# Patient Record
Sex: FEMALE | Race: WHITE | NOT HISPANIC OR LATINO | URBAN - METROPOLITAN AREA
[De-identification: names, ages, dates, MRNs, and addresses within clinical notes are randomized per-mention and may not be internally consistent; named-entity substitution may affect disease eponyms.]

---

## 2023-01-01 ENCOUNTER — INPATIENT (INPATIENT)
Facility: HOSPITAL | Age: 68
LOS: 5 days | End: 2023-01-26
Attending: INTERNAL MEDICINE | Admitting: INTERNAL MEDICINE
Payer: SELF-PAY

## 2023-01-01 VITALS
HEART RATE: 114 BPM | SYSTOLIC BLOOD PRESSURE: 174 MMHG | WEIGHT: 175.05 LBS | OXYGEN SATURATION: 99 % | DIASTOLIC BLOOD PRESSURE: 103 MMHG | RESPIRATION RATE: 12 BRPM

## 2023-01-01 LAB
A1C WITH ESTIMATED AVERAGE GLUCOSE RESULT: 5.2 % — SIGNIFICANT CHANGE UP (ref 4–5.6)
ALBUMIN SERPL ELPH-MCNC: 2.5 G/DL — LOW (ref 3.5–5.2)
ALBUMIN SERPL ELPH-MCNC: 2.6 G/DL — LOW (ref 3.5–5.2)
ALBUMIN SERPL ELPH-MCNC: 2.8 G/DL — LOW (ref 3.5–5.2)
ALBUMIN SERPL ELPH-MCNC: 3.1 G/DL — LOW (ref 3.5–5.2)
ALBUMIN SERPL ELPH-MCNC: 3.7 G/DL — SIGNIFICANT CHANGE UP (ref 3.5–5.2)
ALP SERPL-CCNC: 62 U/L — SIGNIFICANT CHANGE UP (ref 30–115)
ALP SERPL-CCNC: 64 U/L — SIGNIFICANT CHANGE UP (ref 30–115)
ALP SERPL-CCNC: 69 U/L — SIGNIFICANT CHANGE UP (ref 30–115)
ALP SERPL-CCNC: 74 U/L — SIGNIFICANT CHANGE UP (ref 30–115)
ALP SERPL-CCNC: 81 U/L — SIGNIFICANT CHANGE UP (ref 30–115)
ALP SERPL-CCNC: 85 U/L — SIGNIFICANT CHANGE UP (ref 30–115)
ALP SERPL-CCNC: 88 U/L — SIGNIFICANT CHANGE UP (ref 30–115)
ALT FLD-CCNC: 20 U/L — SIGNIFICANT CHANGE UP (ref 0–41)
ALT FLD-CCNC: 23 U/L — SIGNIFICANT CHANGE UP (ref 0–41)
ALT FLD-CCNC: 29 U/L — SIGNIFICANT CHANGE UP (ref 0–41)
ALT FLD-CCNC: 40 U/L — SIGNIFICANT CHANGE UP (ref 0–41)
ALT FLD-CCNC: 58 U/L — HIGH (ref 0–41)
ALT FLD-CCNC: 90 U/L — HIGH (ref 0–41)
ALT FLD-CCNC: 93 U/L — HIGH (ref 0–41)
ANION GAP SERPL CALC-SCNC: 10 MMOL/L — SIGNIFICANT CHANGE UP (ref 7–14)
ANION GAP SERPL CALC-SCNC: 11 MMOL/L — SIGNIFICANT CHANGE UP (ref 7–14)
ANION GAP SERPL CALC-SCNC: 11 MMOL/L — SIGNIFICANT CHANGE UP (ref 7–14)
ANION GAP SERPL CALC-SCNC: 12 MMOL/L — SIGNIFICANT CHANGE UP (ref 7–14)
ANION GAP SERPL CALC-SCNC: 12 MMOL/L — SIGNIFICANT CHANGE UP (ref 7–14)
ANION GAP SERPL CALC-SCNC: 13 MMOL/L — SIGNIFICANT CHANGE UP (ref 7–14)
ANION GAP SERPL CALC-SCNC: 16 MMOL/L — HIGH (ref 7–14)
ANION GAP SERPL CALC-SCNC: 20 MMOL/L — HIGH (ref 7–14)
ANISOCYTOSIS BLD QL: SLIGHT — SIGNIFICANT CHANGE UP
APPEARANCE UR: ABNORMAL
APTT BLD: 30.8 SEC — SIGNIFICANT CHANGE UP (ref 27–39.2)
APTT BLD: 38.7 SEC — SIGNIFICANT CHANGE UP (ref 27–39.2)
APTT BLD: 40.7 SEC — HIGH (ref 27–39.2)
APTT BLD: 41.9 SEC — HIGH (ref 27–39.2)
APTT BLD: 43 SEC — HIGH (ref 27–39.2)
APTT BLD: 48.6 SEC — HIGH (ref 27–39.2)
APTT BLD: 51 SEC — HIGH (ref 27–39.2)
APTT BLD: 55 SEC — HIGH (ref 27–39.2)
APTT BLD: 57.7 SEC — HIGH (ref 27–39.2)
AST SERPL-CCNC: 104 U/L — HIGH (ref 0–41)
AST SERPL-CCNC: 127 U/L — HIGH (ref 0–41)
AST SERPL-CCNC: 163 U/L — HIGH (ref 0–41)
AST SERPL-CCNC: 318 U/L — HIGH (ref 0–41)
AST SERPL-CCNC: 63 U/L — HIGH (ref 0–41)
AST SERPL-CCNC: 73 U/L — HIGH (ref 0–41)
AST SERPL-CCNC: 75 U/L — HIGH (ref 0–41)
BACTERIA # UR AUTO: ABNORMAL
BASE EXCESS BLDA CALC-SCNC: -7.5 MMOL/L — LOW (ref -2–3)
BASOPHILS # BLD AUTO: 0.04 K/UL — SIGNIFICANT CHANGE UP (ref 0–0.2)
BASOPHILS # BLD AUTO: 0.06 K/UL — SIGNIFICANT CHANGE UP (ref 0–0.2)
BASOPHILS # BLD AUTO: 0.07 K/UL — SIGNIFICANT CHANGE UP (ref 0–0.2)
BASOPHILS # BLD AUTO: 0.09 K/UL — SIGNIFICANT CHANGE UP (ref 0–0.2)
BASOPHILS # BLD AUTO: 0.1 K/UL — SIGNIFICANT CHANGE UP (ref 0–0.2)
BASOPHILS # BLD AUTO: 0.1 K/UL — SIGNIFICANT CHANGE UP (ref 0–0.2)
BASOPHILS # BLD AUTO: 0.22 K/UL — HIGH (ref 0–0.2)
BASOPHILS NFR BLD AUTO: 0.2 % — SIGNIFICANT CHANGE UP (ref 0–1)
BASOPHILS NFR BLD AUTO: 0.2 % — SIGNIFICANT CHANGE UP (ref 0–1)
BASOPHILS NFR BLD AUTO: 0.3 % — SIGNIFICANT CHANGE UP (ref 0–1)
BASOPHILS NFR BLD AUTO: 0.3 % — SIGNIFICANT CHANGE UP (ref 0–1)
BASOPHILS NFR BLD AUTO: 0.4 % — SIGNIFICANT CHANGE UP (ref 0–1)
BASOPHILS NFR BLD AUTO: 0.5 % — SIGNIFICANT CHANGE UP (ref 0–1)
BASOPHILS NFR BLD AUTO: 0.7 % — SIGNIFICANT CHANGE UP (ref 0–1)
BILIRUB SERPL-MCNC: 0.4 MG/DL — SIGNIFICANT CHANGE UP (ref 0.2–1.2)
BILIRUB SERPL-MCNC: 0.4 MG/DL — SIGNIFICANT CHANGE UP (ref 0.2–1.2)
BILIRUB SERPL-MCNC: 0.5 MG/DL — SIGNIFICANT CHANGE UP (ref 0.2–1.2)
BILIRUB SERPL-MCNC: 0.5 MG/DL — SIGNIFICANT CHANGE UP (ref 0.2–1.2)
BILIRUB SERPL-MCNC: 0.6 MG/DL — SIGNIFICANT CHANGE UP (ref 0.2–1.2)
BILIRUB UR-MCNC: NEGATIVE — SIGNIFICANT CHANGE UP
BLD GP AB SCN SERPL QL: SIGNIFICANT CHANGE UP
BLD GP AB SCN SERPL QL: SIGNIFICANT CHANGE UP
BUN SERPL-MCNC: 15 MG/DL — SIGNIFICANT CHANGE UP (ref 10–20)
BUN SERPL-MCNC: 25 MG/DL — HIGH (ref 10–20)
BUN SERPL-MCNC: 29 MG/DL — HIGH (ref 10–20)
BUN SERPL-MCNC: 35 MG/DL — HIGH (ref 10–20)
BUN SERPL-MCNC: 36 MG/DL — HIGH (ref 10–20)
BUN SERPL-MCNC: 43 MG/DL — HIGH (ref 10–20)
BUN SERPL-MCNC: 46 MG/DL — HIGH (ref 10–20)
BUN SERPL-MCNC: 47 MG/DL — HIGH (ref 10–20)
CALCIUM SERPL-MCNC: 7.5 MG/DL — LOW (ref 8.4–10.5)
CALCIUM SERPL-MCNC: 7.6 MG/DL — LOW (ref 8.4–10.5)
CALCIUM SERPL-MCNC: 7.7 MG/DL — LOW (ref 8.4–10.5)
CALCIUM SERPL-MCNC: 7.9 MG/DL — LOW (ref 8.4–10.5)
CALCIUM SERPL-MCNC: 8.3 MG/DL — LOW (ref 8.4–10.5)
CALCIUM SERPL-MCNC: 8.5 MG/DL — SIGNIFICANT CHANGE UP (ref 8.4–10.5)
CALCIUM SERPL-MCNC: 8.6 MG/DL — SIGNIFICANT CHANGE UP (ref 8.4–10.5)
CALCIUM SERPL-MCNC: 9 MG/DL — SIGNIFICANT CHANGE UP (ref 8.4–10.5)
CHLORIDE SERPL-SCNC: 103 MMOL/L — SIGNIFICANT CHANGE UP (ref 98–110)
CHLORIDE SERPL-SCNC: 108 MMOL/L — SIGNIFICANT CHANGE UP (ref 98–110)
CHLORIDE SERPL-SCNC: 109 MMOL/L — SIGNIFICANT CHANGE UP (ref 98–110)
CHLORIDE SERPL-SCNC: 109 MMOL/L — SIGNIFICANT CHANGE UP (ref 98–110)
CHLORIDE SERPL-SCNC: 111 MMOL/L — HIGH (ref 98–110)
CHLORIDE SERPL-SCNC: 113 MMOL/L — HIGH (ref 98–110)
CHOLEST SERPL-MCNC: 179 MG/DL — SIGNIFICANT CHANGE UP
CK MB CFR SERPL CALC: 224.5 NG/ML — HIGH (ref 0.6–6.3)
CK SERPL-CCNC: 2498 U/L — HIGH (ref 0–225)
CO2 SERPL-SCNC: 19 MMOL/L — SIGNIFICANT CHANGE UP (ref 17–32)
CO2 SERPL-SCNC: 20 MMOL/L — SIGNIFICANT CHANGE UP (ref 17–32)
CO2 SERPL-SCNC: 20 MMOL/L — SIGNIFICANT CHANGE UP (ref 17–32)
CO2 SERPL-SCNC: 22 MMOL/L — SIGNIFICANT CHANGE UP (ref 17–32)
CO2 SERPL-SCNC: 23 MMOL/L — SIGNIFICANT CHANGE UP (ref 17–32)
CO2 SERPL-SCNC: 24 MMOL/L — SIGNIFICANT CHANGE UP (ref 17–32)
COLOR SPEC: YELLOW — SIGNIFICANT CHANGE UP
CREAT SERPL-MCNC: 1.1 MG/DL — SIGNIFICANT CHANGE UP (ref 0.7–1.5)
CREAT SERPL-MCNC: 1.2 MG/DL — SIGNIFICANT CHANGE UP (ref 0.7–1.5)
CREAT SERPL-MCNC: 1.2 MG/DL — SIGNIFICANT CHANGE UP (ref 0.7–1.5)
CREAT SERPL-MCNC: 1.3 MG/DL — SIGNIFICANT CHANGE UP (ref 0.7–1.5)
CREAT SERPL-MCNC: 1.6 MG/DL — HIGH (ref 0.7–1.5)
CREAT SERPL-MCNC: 1.8 MG/DL — HIGH (ref 0.7–1.5)
CULTURE RESULTS: NO GROWTH — SIGNIFICANT CHANGE UP
CULTURE RESULTS: NO GROWTH — SIGNIFICANT CHANGE UP
CULTURE RESULTS: SIGNIFICANT CHANGE UP
D DIMER BLD IA.RAPID-MCNC: 622 NG/ML DDU — HIGH
DIFF PNL FLD: ABNORMAL
EGFR: 30 ML/MIN/1.73M2 — LOW
EGFR: 35 ML/MIN/1.73M2 — LOW
EGFR: 45 ML/MIN/1.73M2 — LOW
EGFR: 50 ML/MIN/1.73M2 — LOW
EGFR: 50 ML/MIN/1.73M2 — LOW
EGFR: 55 ML/MIN/1.73M2 — LOW
EOSINOPHIL # BLD AUTO: 0 K/UL — SIGNIFICANT CHANGE UP (ref 0–0.7)
EOSINOPHIL # BLD AUTO: 0 K/UL — SIGNIFICANT CHANGE UP (ref 0–0.7)
EOSINOPHIL # BLD AUTO: 0.01 K/UL — SIGNIFICANT CHANGE UP (ref 0–0.7)
EOSINOPHIL # BLD AUTO: 0.04 K/UL — SIGNIFICANT CHANGE UP (ref 0–0.7)
EOSINOPHIL # BLD AUTO: 0.05 K/UL — SIGNIFICANT CHANGE UP (ref 0–0.7)
EOSINOPHIL # BLD AUTO: 0.07 K/UL — SIGNIFICANT CHANGE UP (ref 0–0.7)
EOSINOPHIL # BLD AUTO: 0.12 K/UL — SIGNIFICANT CHANGE UP (ref 0–0.7)
EOSINOPHIL # BLD AUTO: 0.14 K/UL — SIGNIFICANT CHANGE UP (ref 0–0.7)
EOSINOPHIL # BLD AUTO: 0.31 K/UL — SIGNIFICANT CHANGE UP (ref 0–0.7)
EOSINOPHIL NFR BLD AUTO: 0 % — SIGNIFICANT CHANGE UP (ref 0–8)
EOSINOPHIL NFR BLD AUTO: 0.2 % — SIGNIFICANT CHANGE UP (ref 0–8)
EOSINOPHIL NFR BLD AUTO: 0.2 % — SIGNIFICANT CHANGE UP (ref 0–8)
EOSINOPHIL NFR BLD AUTO: 0.3 % — SIGNIFICANT CHANGE UP (ref 0–8)
EOSINOPHIL NFR BLD AUTO: 0.7 % — SIGNIFICANT CHANGE UP (ref 0–8)
EOSINOPHIL NFR BLD AUTO: 0.9 % — SIGNIFICANT CHANGE UP (ref 0–8)
EOSINOPHIL NFR BLD AUTO: 0.9 % — SIGNIFICANT CHANGE UP (ref 0–8)
EPI CELLS # UR: ABNORMAL /HPF
ESTIMATED AVERAGE GLUCOSE: 103 MG/DL — SIGNIFICANT CHANGE UP (ref 68–114)
FUNGITELL: SIGNIFICANT CHANGE UP PG/ML
GAS PNL BLDA: SIGNIFICANT CHANGE UP
GIANT PLATELETS BLD QL SMEAR: PRESENT — SIGNIFICANT CHANGE UP
GLUCOSE BLDC GLUCOMTR-MCNC: 239 MG/DL — HIGH (ref 70–99)
GLUCOSE SERPL-MCNC: 120 MG/DL — HIGH (ref 70–99)
GLUCOSE SERPL-MCNC: 121 MG/DL — HIGH (ref 70–99)
GLUCOSE SERPL-MCNC: 122 MG/DL — HIGH (ref 70–99)
GLUCOSE SERPL-MCNC: 128 MG/DL — HIGH (ref 70–99)
GLUCOSE SERPL-MCNC: 134 MG/DL — HIGH (ref 70–99)
GLUCOSE SERPL-MCNC: 137 MG/DL — HIGH (ref 70–99)
GLUCOSE SERPL-MCNC: 143 MG/DL — HIGH (ref 70–99)
GLUCOSE SERPL-MCNC: 343 MG/DL — HIGH (ref 70–99)
GLUCOSE UR QL: NEGATIVE MG/DL — SIGNIFICANT CHANGE UP
GRAM STN FLD: SIGNIFICANT CHANGE UP
GRAN CASTS # UR COMP ASSIST: ABNORMAL /LPF
HCO3 BLDA-SCNC: 19 MMOL/L — LOW (ref 21–28)
HCT VFR BLD CALC: 26 % — LOW (ref 37–47)
HCT VFR BLD CALC: 29 % — LOW (ref 37–47)
HCT VFR BLD CALC: 29.4 % — LOW (ref 37–47)
HCT VFR BLD CALC: 31.2 % — LOW (ref 37–47)
HCT VFR BLD CALC: 33.1 % — LOW (ref 37–47)
HCT VFR BLD CALC: 37 % — SIGNIFICANT CHANGE UP (ref 37–47)
HCT VFR BLD CALC: 42 % — SIGNIFICANT CHANGE UP (ref 37–47)
HCT VFR BLD CALC: 42.6 % — SIGNIFICANT CHANGE UP (ref 37–47)
HCT VFR BLD CALC: 49.8 % — HIGH (ref 37–47)
HCV AB S/CO SERPL IA: 0.04 COI — SIGNIFICANT CHANGE UP
HCV AB SERPL-IMP: SIGNIFICANT CHANGE UP
HDLC SERPL-MCNC: 43 MG/DL — LOW
HGB BLD-MCNC: 10.1 G/DL — LOW (ref 12–16)
HGB BLD-MCNC: 10.7 G/DL — LOW (ref 12–16)
HGB BLD-MCNC: 12.1 G/DL — SIGNIFICANT CHANGE UP (ref 12–16)
HGB BLD-MCNC: 13.9 G/DL — SIGNIFICANT CHANGE UP (ref 12–16)
HGB BLD-MCNC: 13.9 G/DL — SIGNIFICANT CHANGE UP (ref 12–16)
HGB BLD-MCNC: 16 G/DL — SIGNIFICANT CHANGE UP (ref 12–16)
HGB BLD-MCNC: 8.1 G/DL — LOW (ref 12–16)
HGB BLD-MCNC: 9.2 G/DL — LOW (ref 12–16)
HGB BLD-MCNC: 9.3 G/DL — LOW (ref 12–16)
HOROWITZ INDEX BLDA+IHG-RTO: 70 — SIGNIFICANT CHANGE UP
IMM GRANULOCYTES NFR BLD AUTO: 0.5 % — HIGH (ref 0.1–0.3)
IMM GRANULOCYTES NFR BLD AUTO: 0.6 % — HIGH (ref 0.1–0.3)
IMM GRANULOCYTES NFR BLD AUTO: 0.7 % — HIGH (ref 0.1–0.3)
IMM GRANULOCYTES NFR BLD AUTO: 1 % — HIGH (ref 0.1–0.3)
IMM GRANULOCYTES NFR BLD AUTO: 1.5 % — HIGH (ref 0.1–0.3)
IMM GRANULOCYTES NFR BLD AUTO: 1.6 % — HIGH (ref 0.1–0.3)
IMM GRANULOCYTES NFR BLD AUTO: 2.6 % — HIGH (ref 0.1–0.3)
IMM GRANULOCYTES NFR BLD AUTO: 3.1 % — HIGH (ref 0.1–0.3)
IMM GRANULOCYTES NFR BLD AUTO: 3.2 % — HIGH (ref 0.1–0.3)
INR BLD: 1 RATIO — SIGNIFICANT CHANGE UP (ref 0.65–1.3)
KETONES UR-MCNC: NEGATIVE — SIGNIFICANT CHANGE UP
LACTATE SERPL-SCNC: 2.7 MMOL/L — HIGH (ref 0.7–2)
LEGIONELLA AG UR QL: NEGATIVE — SIGNIFICANT CHANGE UP
LEUKOCYTE ESTERASE UR-ACNC: NEGATIVE — SIGNIFICANT CHANGE UP
LIPID PNL WITH DIRECT LDL SERPL: 104 MG/DL — HIGH
LYMPHOCYTES # BLD AUTO: 1.52 K/UL — SIGNIFICANT CHANGE UP (ref 1.2–3.4)
LYMPHOCYTES # BLD AUTO: 1.9 K/UL — SIGNIFICANT CHANGE UP (ref 1.2–3.4)
LYMPHOCYTES # BLD AUTO: 10.3 % — LOW (ref 20.5–51.1)
LYMPHOCYTES # BLD AUTO: 10.6 % — LOW (ref 20.5–51.1)
LYMPHOCYTES # BLD AUTO: 12.4 % — LOW (ref 20.5–51.1)
LYMPHOCYTES # BLD AUTO: 13.3 % — LOW (ref 20.5–51.1)
LYMPHOCYTES # BLD AUTO: 15 % — LOW (ref 20.5–51.1)
LYMPHOCYTES # BLD AUTO: 15.3 % — LOW (ref 20.5–51.1)
LYMPHOCYTES # BLD AUTO: 2.07 K/UL — SIGNIFICANT CHANGE UP (ref 1.2–3.4)
LYMPHOCYTES # BLD AUTO: 2.38 K/UL — SIGNIFICANT CHANGE UP (ref 1.2–3.4)
LYMPHOCYTES # BLD AUTO: 2.72 K/UL — SIGNIFICANT CHANGE UP (ref 1.2–3.4)
LYMPHOCYTES # BLD AUTO: 2.87 K/UL — SIGNIFICANT CHANGE UP (ref 1.2–3.4)
LYMPHOCYTES # BLD AUTO: 24.06 K/UL — HIGH (ref 1.2–3.4)
LYMPHOCYTES # BLD AUTO: 3.15 K/UL — SIGNIFICANT CHANGE UP (ref 1.2–3.4)
LYMPHOCYTES # BLD AUTO: 3.29 K/UL — SIGNIFICANT CHANGE UP (ref 1.2–3.4)
LYMPHOCYTES # BLD AUTO: 6.7 % — LOW (ref 20.5–51.1)
LYMPHOCYTES # BLD AUTO: 71.5 % — HIGH (ref 20.5–51.1)
LYMPHOCYTES # BLD AUTO: 9.5 % — LOW (ref 20.5–51.1)
MAGNESIUM SERPL-MCNC: 2 MG/DL — SIGNIFICANT CHANGE UP (ref 1.8–2.4)
MAGNESIUM SERPL-MCNC: 2.4 MG/DL — SIGNIFICANT CHANGE UP (ref 1.8–2.4)
MAGNESIUM SERPL-MCNC: 2.5 MG/DL — HIGH (ref 1.8–2.4)
MAGNESIUM SERPL-MCNC: 2.6 MG/DL — HIGH (ref 1.8–2.4)
MAGNESIUM SERPL-MCNC: 2.6 MG/DL — HIGH (ref 1.8–2.4)
MANUAL DIF COMMENT BLD-IMP: SIGNIFICANT CHANGE UP
MANUAL SMEAR VERIFICATION: SIGNIFICANT CHANGE UP
MCHC RBC-ENTMCNC: 27.6 PG — SIGNIFICANT CHANGE UP (ref 27–31)
MCHC RBC-ENTMCNC: 27.9 PG — SIGNIFICANT CHANGE UP (ref 27–31)
MCHC RBC-ENTMCNC: 27.9 PG — SIGNIFICANT CHANGE UP (ref 27–31)
MCHC RBC-ENTMCNC: 28 PG — SIGNIFICANT CHANGE UP (ref 27–31)
MCHC RBC-ENTMCNC: 28 PG — SIGNIFICANT CHANGE UP (ref 27–31)
MCHC RBC-ENTMCNC: 28.2 PG — SIGNIFICANT CHANGE UP (ref 27–31)
MCHC RBC-ENTMCNC: 28.2 PG — SIGNIFICANT CHANGE UP (ref 27–31)
MCHC RBC-ENTMCNC: 28.3 PG — SIGNIFICANT CHANGE UP (ref 27–31)
MCHC RBC-ENTMCNC: 28.3 PG — SIGNIFICANT CHANGE UP (ref 27–31)
MCHC RBC-ENTMCNC: 31.2 G/DL — LOW (ref 32–37)
MCHC RBC-ENTMCNC: 31.3 G/DL — LOW (ref 32–37)
MCHC RBC-ENTMCNC: 32.1 G/DL — SIGNIFICANT CHANGE UP (ref 32–37)
MCHC RBC-ENTMCNC: 32.1 G/DL — SIGNIFICANT CHANGE UP (ref 32–37)
MCHC RBC-ENTMCNC: 32.3 G/DL — SIGNIFICANT CHANGE UP (ref 32–37)
MCHC RBC-ENTMCNC: 32.4 G/DL — SIGNIFICANT CHANGE UP (ref 32–37)
MCHC RBC-ENTMCNC: 32.6 G/DL — SIGNIFICANT CHANGE UP (ref 32–37)
MCHC RBC-ENTMCNC: 32.7 G/DL — SIGNIFICANT CHANGE UP (ref 32–37)
MCHC RBC-ENTMCNC: 33.1 G/DL — SIGNIFICANT CHANGE UP (ref 32–37)
MCV RBC AUTO: 84.2 FL — SIGNIFICANT CHANGE UP (ref 81–99)
MCV RBC AUTO: 84.7 FL — SIGNIFICANT CHANGE UP (ref 81–99)
MCV RBC AUTO: 85.6 FL — SIGNIFICANT CHANGE UP (ref 81–99)
MCV RBC AUTO: 87.1 FL — SIGNIFICANT CHANGE UP (ref 81–99)
MCV RBC AUTO: 87.4 FL — SIGNIFICANT CHANGE UP (ref 81–99)
MCV RBC AUTO: 87.9 FL — SIGNIFICANT CHANGE UP (ref 81–99)
MCV RBC AUTO: 88 FL — SIGNIFICANT CHANGE UP (ref 81–99)
MCV RBC AUTO: 89.1 FL — SIGNIFICANT CHANGE UP (ref 81–99)
MCV RBC AUTO: 90 FL — SIGNIFICANT CHANGE UP (ref 81–99)
MONOCYTES # BLD AUTO: 0.88 K/UL — HIGH (ref 0.1–0.6)
MONOCYTES # BLD AUTO: 1.29 K/UL — HIGH (ref 0.1–0.6)
MONOCYTES # BLD AUTO: 1.67 K/UL — HIGH (ref 0.1–0.6)
MONOCYTES # BLD AUTO: 1.7 K/UL — HIGH (ref 0.1–0.6)
MONOCYTES # BLD AUTO: 1.71 K/UL — HIGH (ref 0.1–0.6)
MONOCYTES # BLD AUTO: 1.82 K/UL — HIGH (ref 0.1–0.6)
MONOCYTES # BLD AUTO: 2.03 K/UL — HIGH (ref 0.1–0.6)
MONOCYTES # BLD AUTO: 2.63 K/UL — HIGH (ref 0.1–0.6)
MONOCYTES # BLD AUTO: 3.04 K/UL — HIGH (ref 0.1–0.6)
MONOCYTES NFR BLD AUTO: 12.3 % — HIGH (ref 1.7–9.3)
MONOCYTES NFR BLD AUTO: 2.6 % — SIGNIFICANT CHANGE UP (ref 1.7–9.3)
MONOCYTES NFR BLD AUTO: 7.5 % — SIGNIFICANT CHANGE UP (ref 1.7–9.3)
MONOCYTES NFR BLD AUTO: 7.5 % — SIGNIFICANT CHANGE UP (ref 1.7–9.3)
MONOCYTES NFR BLD AUTO: 8.3 % — SIGNIFICANT CHANGE UP (ref 1.7–9.3)
MONOCYTES NFR BLD AUTO: 8.4 % — SIGNIFICANT CHANGE UP (ref 1.7–9.3)
MONOCYTES NFR BLD AUTO: 9.4 % — HIGH (ref 1.7–9.3)
MONOCYTES NFR BLD AUTO: 9.5 % — HIGH (ref 1.7–9.3)
MONOCYTES NFR BLD AUTO: 9.7 % — HIGH (ref 1.7–9.3)
MRSA PCR RESULT.: NEGATIVE — SIGNIFICANT CHANGE UP
NEUTROPHILS # BLD AUTO: 15.14 K/UL — HIGH (ref 1.4–6.5)
NEUTROPHILS # BLD AUTO: 15.93 K/UL — HIGH (ref 1.4–6.5)
NEUTROPHILS # BLD AUTO: 17.17 K/UL — HIGH (ref 1.4–6.5)
NEUTROPHILS # BLD AUTO: 18.11 K/UL — HIGH (ref 1.4–6.5)
NEUTROPHILS # BLD AUTO: 18.12 K/UL — HIGH (ref 1.4–6.5)
NEUTROPHILS # BLD AUTO: 19.02 K/UL — HIGH (ref 1.4–6.5)
NEUTROPHILS # BLD AUTO: 22.12 K/UL — HIGH (ref 1.4–6.5)
NEUTROPHILS # BLD AUTO: 7.16 K/UL — HIGH (ref 1.4–6.5)
NEUTROPHILS # BLD AUTO: 9.59 K/UL — HIGH (ref 1.4–6.5)
NEUTROPHILS NFR BLD AUTO: 21.2 % — LOW (ref 42.2–75.2)
NEUTROPHILS NFR BLD AUTO: 70.7 % — SIGNIFICANT CHANGE UP (ref 42.2–75.2)
NEUTROPHILS NFR BLD AUTO: 72 % — SIGNIFICANT CHANGE UP (ref 42.2–75.2)
NEUTROPHILS NFR BLD AUTO: 73.5 % — SIGNIFICANT CHANGE UP (ref 42.2–75.2)
NEUTROPHILS NFR BLD AUTO: 77.9 % — HIGH (ref 42.2–75.2)
NEUTROPHILS NFR BLD AUTO: 79.5 % — HIGH (ref 42.2–75.2)
NEUTROPHILS NFR BLD AUTO: 79.7 % — HIGH (ref 42.2–75.2)
NEUTROPHILS NFR BLD AUTO: 80.4 % — HIGH (ref 42.2–75.2)
NEUTROPHILS NFR BLD AUTO: 83.9 % — HIGH (ref 42.2–75.2)
NEUTS BAND # BLD: 17 % — HIGH (ref 0–6)
NEUTS BAND # BLD: 2 % — SIGNIFICANT CHANGE UP (ref 0–6)
NITRITE UR-MCNC: NEGATIVE — SIGNIFICANT CHANGE UP
NON HDL CHOLESTEROL: 136 MG/DL — HIGH
NRBC # BLD: 0 /100 WBCS — SIGNIFICANT CHANGE UP (ref 0–0)
NRBC # BLD: 0 /100 — SIGNIFICANT CHANGE UP (ref 0–0)
NRBC # BLD: 0 /100 — SIGNIFICANT CHANGE UP (ref 0–0)
NRBC # BLD: 1 /100 WBCS — HIGH (ref 0–0)
NRBC # BLD: 1 /100 WBCS — HIGH (ref 0–0)
NSE FLD-MCNC: 109 NG/ML — HIGH (ref 0–17.6)
NT-PROBNP SERPL-SCNC: HIGH PG/ML (ref 0–300)
PCO2 BLDA: 43 MMHG — SIGNIFICANT CHANGE UP (ref 35–48)
PH BLDA: 7.26 — LOW (ref 7.35–7.45)
PH UR: 5 — SIGNIFICANT CHANGE UP (ref 5–8)
PLAT MORPH BLD: NORMAL — SIGNIFICANT CHANGE UP
PLAT MORPH BLD: NORMAL — SIGNIFICANT CHANGE UP
PLATELET # BLD AUTO: 114 K/UL — LOW (ref 130–400)
PLATELET # BLD AUTO: 124 K/UL — LOW (ref 130–400)
PLATELET # BLD AUTO: 141 K/UL — SIGNIFICANT CHANGE UP (ref 130–400)
PLATELET # BLD AUTO: 162 K/UL — SIGNIFICANT CHANGE UP (ref 130–400)
PLATELET # BLD AUTO: 168 K/UL — SIGNIFICANT CHANGE UP (ref 130–400)
PLATELET # BLD AUTO: 174 K/UL — SIGNIFICANT CHANGE UP (ref 130–400)
PLATELET # BLD AUTO: 205 K/UL — SIGNIFICANT CHANGE UP (ref 130–400)
PLATELET # BLD AUTO: 263 K/UL — SIGNIFICANT CHANGE UP (ref 130–400)
PLATELET # BLD AUTO: 84 K/UL — LOW (ref 130–400)
PO2 BLDA: 80 MMHG — LOW (ref 83–108)
POLYCHROMASIA BLD QL SMEAR: SIGNIFICANT CHANGE UP
POTASSIUM SERPL-MCNC: 3.8 MMOL/L — SIGNIFICANT CHANGE UP (ref 3.5–5)
POTASSIUM SERPL-MCNC: 4 MMOL/L — SIGNIFICANT CHANGE UP (ref 3.5–5)
POTASSIUM SERPL-MCNC: 4 MMOL/L — SIGNIFICANT CHANGE UP (ref 3.5–5)
POTASSIUM SERPL-MCNC: 4.1 MMOL/L — SIGNIFICANT CHANGE UP (ref 3.5–5)
POTASSIUM SERPL-MCNC: 4.5 MMOL/L — SIGNIFICANT CHANGE UP (ref 3.5–5)
POTASSIUM SERPL-MCNC: 4.5 MMOL/L — SIGNIFICANT CHANGE UP (ref 3.5–5)
POTASSIUM SERPL-MCNC: 4.6 MMOL/L — SIGNIFICANT CHANGE UP (ref 3.5–5)
POTASSIUM SERPL-MCNC: 4.9 MMOL/L — SIGNIFICANT CHANGE UP (ref 3.5–5)
POTASSIUM SERPL-SCNC: 3.8 MMOL/L — SIGNIFICANT CHANGE UP (ref 3.5–5)
POTASSIUM SERPL-SCNC: 4 MMOL/L — SIGNIFICANT CHANGE UP (ref 3.5–5)
POTASSIUM SERPL-SCNC: 4 MMOL/L — SIGNIFICANT CHANGE UP (ref 3.5–5)
POTASSIUM SERPL-SCNC: 4.1 MMOL/L — SIGNIFICANT CHANGE UP (ref 3.5–5)
POTASSIUM SERPL-SCNC: 4.5 MMOL/L — SIGNIFICANT CHANGE UP (ref 3.5–5)
POTASSIUM SERPL-SCNC: 4.5 MMOL/L — SIGNIFICANT CHANGE UP (ref 3.5–5)
POTASSIUM SERPL-SCNC: 4.6 MMOL/L — SIGNIFICANT CHANGE UP (ref 3.5–5)
POTASSIUM SERPL-SCNC: 4.9 MMOL/L — SIGNIFICANT CHANGE UP (ref 3.5–5)
PROCALCITONIN SERPL-MCNC: 10.1 NG/ML — HIGH (ref 0.02–0.1)
PROCALCITONIN SERPL-MCNC: 2.93 NG/ML — HIGH (ref 0.02–0.1)
PROCALCITONIN SERPL-MCNC: 3.9 NG/ML — HIGH (ref 0.02–0.1)
PROMYELOCYTES # FLD: 1 % — HIGH (ref 0–0)
PROT SERPL-MCNC: 4.5 G/DL — LOW (ref 6–8)
PROT SERPL-MCNC: 4.7 G/DL — LOW (ref 6–8)
PROT SERPL-MCNC: 4.8 G/DL — LOW (ref 6–8)
PROT SERPL-MCNC: 5.1 G/DL — LOW (ref 6–8)
PROT SERPL-MCNC: 5.9 G/DL — LOW (ref 6–8)
PROT UR-MCNC: 100 MG/DL
PROTHROM AB SERPL-ACNC: 11.4 SEC — SIGNIFICANT CHANGE UP (ref 9.95–12.87)
RAPID RVP RESULT: DETECTED
RBC # BLD: 2.89 M/UL — LOW (ref 4.2–5.4)
RBC # BLD: 3.3 M/UL — LOW (ref 4.2–5.4)
RBC # BLD: 3.3 M/UL — LOW (ref 4.2–5.4)
RBC # BLD: 3.57 M/UL — LOW (ref 4.2–5.4)
RBC # BLD: 3.8 M/UL — LOW (ref 4.2–5.4)
RBC # BLD: 4.32 M/UL — SIGNIFICANT CHANGE UP (ref 4.2–5.4)
RBC # BLD: 4.99 M/UL — SIGNIFICANT CHANGE UP (ref 4.2–5.4)
RBC # BLD: 5.03 M/UL — SIGNIFICANT CHANGE UP (ref 4.2–5.4)
RBC # BLD: 5.66 M/UL — HIGH (ref 4.2–5.4)
RBC # FLD: 13.7 % — SIGNIFICANT CHANGE UP (ref 11.5–14.5)
RBC # FLD: 13.9 % — SIGNIFICANT CHANGE UP (ref 11.5–14.5)
RBC # FLD: 14.3 % — SIGNIFICANT CHANGE UP (ref 11.5–14.5)
RBC # FLD: 14.4 % — SIGNIFICANT CHANGE UP (ref 11.5–14.5)
RBC # FLD: 14.6 % — HIGH (ref 11.5–14.5)
RBC # FLD: 14.7 % — HIGH (ref 11.5–14.5)
RBC # FLD: 15 % — HIGH (ref 11.5–14.5)
RBC BLD AUTO: ABNORMAL
RBC BLD AUTO: NORMAL — SIGNIFICANT CHANGE UP
RBC CASTS # UR COMP ASSIST: ABNORMAL /HPF
RV+EV RNA SPEC QL NAA+PROBE: DETECTED
S PNEUM AG UR QL: NEGATIVE — SIGNIFICANT CHANGE UP
SAO2 % BLDA: 95.7 % — SIGNIFICANT CHANGE UP (ref 94–98)
SARS-COV-2 RNA SPEC QL NAA+PROBE: SIGNIFICANT CHANGE UP
SARS-COV-2 RNA SPEC QL NAA+PROBE: SIGNIFICANT CHANGE UP
SODIUM SERPL-SCNC: 143 MMOL/L — SIGNIFICANT CHANGE UP (ref 135–146)
SODIUM SERPL-SCNC: 144 MMOL/L — SIGNIFICANT CHANGE UP (ref 135–146)
SODIUM SERPL-SCNC: 144 MMOL/L — SIGNIFICANT CHANGE UP (ref 135–146)
SODIUM SERPL-SCNC: 146 MMOL/L — SIGNIFICANT CHANGE UP (ref 135–146)
SODIUM SERPL-SCNC: 146 MMOL/L — SIGNIFICANT CHANGE UP (ref 135–146)
SP GR SPEC: 1.01 — SIGNIFICANT CHANGE UP (ref 1.01–1.03)
SPECIMEN SOURCE: SIGNIFICANT CHANGE UP
TRIGL SERPL-MCNC: 161 MG/DL — HIGH
TROPONIN T SERPL-MCNC: 0.61 NG/ML — CRITICAL HIGH
TROPONIN T SERPL-MCNC: 10.53 NG/ML — CRITICAL HIGH
TROPONIN T SERPL-MCNC: 10.89 NG/ML — CRITICAL HIGH
TROPONIN T SERPL-MCNC: 2.76 NG/ML — CRITICAL HIGH
TROPONIN T SERPL-MCNC: 4.32 NG/ML — CRITICAL HIGH
TROPONIN T SERPL-MCNC: 6.53 NG/ML — CRITICAL HIGH
TROPONIN T SERPL-MCNC: 9.45 NG/ML — CRITICAL HIGH
UROBILINOGEN FLD QL: 0.2 MG/DL — SIGNIFICANT CHANGE UP
VARIANT LYMPHS # BLD: 10 % — HIGH (ref 0–5)
WBC # BLD: 13.56 K/UL — HIGH (ref 4.8–10.8)
WBC # BLD: 19.98 K/UL — HIGH (ref 4.8–10.8)
WBC # BLD: 21.02 K/UL — HIGH (ref 4.8–10.8)
WBC # BLD: 22.01 K/UL — HIGH (ref 4.8–10.8)
WBC # BLD: 22.54 K/UL — HIGH (ref 4.8–10.8)
WBC # BLD: 22.68 K/UL — HIGH (ref 4.8–10.8)
WBC # BLD: 24.68 K/UL — HIGH (ref 4.8–10.8)
WBC # BLD: 27.86 K/UL — HIGH (ref 4.8–10.8)
WBC # BLD: 33.66 K/UL — HIGH (ref 4.8–10.8)
WBC # FLD AUTO: 13.56 K/UL — HIGH (ref 4.8–10.8)
WBC # FLD AUTO: 19.98 K/UL — HIGH (ref 4.8–10.8)
WBC # FLD AUTO: 21.02 K/UL — HIGH (ref 4.8–10.8)
WBC # FLD AUTO: 22.01 K/UL — HIGH (ref 4.8–10.8)
WBC # FLD AUTO: 22.54 K/UL — HIGH (ref 4.8–10.8)
WBC # FLD AUTO: 22.68 K/UL — HIGH (ref 4.8–10.8)
WBC # FLD AUTO: 24.68 K/UL — HIGH (ref 4.8–10.8)
WBC # FLD AUTO: 27.86 K/UL — HIGH (ref 4.8–10.8)
WBC # FLD AUTO: 33.66 K/UL — HIGH (ref 4.8–10.8)
WBC UR QL: SIGNIFICANT CHANGE UP /HPF

## 2023-01-01 PROCEDURE — 93306 TTE W/DOPPLER COMPLETE: CPT | Mod: 26

## 2023-01-01 PROCEDURE — 99291 CRITICAL CARE FIRST HOUR: CPT

## 2023-01-01 PROCEDURE — 99233 SBSQ HOSP IP/OBS HIGH 50: CPT | Mod: NC

## 2023-01-01 PROCEDURE — 93970 EXTREMITY STUDY: CPT | Mod: 26

## 2023-01-01 PROCEDURE — 70450 CT HEAD/BRAIN W/O DYE: CPT | Mod: 26

## 2023-01-01 PROCEDURE — 99233 SBSQ HOSP IP/OBS HIGH 50: CPT

## 2023-01-01 PROCEDURE — 99232 SBSQ HOSP IP/OBS MODERATE 35: CPT | Mod: NC

## 2023-01-01 PROCEDURE — 71045 X-RAY EXAM CHEST 1 VIEW: CPT | Mod: 26,77

## 2023-01-01 PROCEDURE — 99254 IP/OBS CNSLTJ NEW/EST MOD 60: CPT

## 2023-01-01 PROCEDURE — 99255 IP/OBS CONSLTJ NEW/EST HI 80: CPT

## 2023-01-01 PROCEDURE — 99223 1ST HOSP IP/OBS HIGH 75: CPT

## 2023-01-01 PROCEDURE — 95720 EEG PHY/QHP EA INCR W/VEEG: CPT

## 2023-01-01 PROCEDURE — 93010 ELECTROCARDIOGRAM REPORT: CPT | Mod: 77

## 2023-01-01 PROCEDURE — 71045 X-RAY EXAM CHEST 1 VIEW: CPT | Mod: 26

## 2023-01-01 PROCEDURE — 93010 ELECTROCARDIOGRAM REPORT: CPT

## 2023-01-01 PROCEDURE — 95819 EEG AWAKE AND ASLEEP: CPT | Mod: 26

## 2023-01-01 PROCEDURE — 33210 INSERT ELECTRD/PM CATH SNGL: CPT

## 2023-01-01 PROCEDURE — 74018 RADEX ABDOMEN 1 VIEW: CPT | Mod: 26

## 2023-01-01 PROCEDURE — 70450 CT HEAD/BRAIN W/O DYE: CPT | Mod: 26,MA

## 2023-01-01 PROCEDURE — 71045 X-RAY EXAM CHEST 1 VIEW: CPT | Mod: 26,76

## 2023-01-01 PROCEDURE — 99285 EMERGENCY DEPT VISIT HI MDM: CPT

## 2023-01-01 PROCEDURE — 71275 CT ANGIOGRAPHY CHEST: CPT | Mod: 26,MA

## 2023-01-01 PROCEDURE — 74177 CT ABD & PELVIS W/CONTRAST: CPT | Mod: 26,MA

## 2023-01-01 RX ORDER — ROBINUL 0.2 MG/ML
0.2 INJECTION INTRAMUSCULAR; INTRAVENOUS EVERY 6 HOURS
Refills: 0 | Status: DISCONTINUED | OUTPATIENT
Start: 2023-01-01 | End: 2023-01-01

## 2023-01-01 RX ORDER — LEVETIRACETAM 250 MG/1
500 TABLET, FILM COATED ORAL EVERY 12 HOURS
Refills: 0 | Status: DISCONTINUED | OUTPATIENT
Start: 2023-01-01 | End: 2023-01-01

## 2023-01-01 RX ORDER — MIDAZOLAM HYDROCHLORIDE 1 MG/ML
0.02 INJECTION, SOLUTION INTRAMUSCULAR; INTRAVENOUS
Qty: 100 | Refills: 0 | Status: DISCONTINUED | OUTPATIENT
Start: 2023-01-01 | End: 2023-01-01

## 2023-01-01 RX ORDER — INSULIN HUMAN 100 [IU]/ML
10 INJECTION, SOLUTION SUBCUTANEOUS ONCE
Refills: 0 | Status: COMPLETED | OUTPATIENT
Start: 2023-01-01 | End: 2023-01-01

## 2023-01-01 RX ORDER — VANCOMYCIN HCL 1 G
1250 VIAL (EA) INTRAVENOUS ONCE
Refills: 0 | Status: COMPLETED | OUTPATIENT
Start: 2023-01-01 | End: 2023-01-01

## 2023-01-01 RX ORDER — CEFTRIAXONE 500 MG/1
2000 INJECTION, POWDER, FOR SOLUTION INTRAMUSCULAR; INTRAVENOUS EVERY 24 HOURS
Refills: 0 | Status: DISCONTINUED | OUTPATIENT
Start: 2023-01-01 | End: 2023-01-01

## 2023-01-01 RX ORDER — SENNA PLUS 8.6 MG/1
2 TABLET ORAL AT BEDTIME
Refills: 0 | Status: DISCONTINUED | OUTPATIENT
Start: 2023-01-01 | End: 2023-01-01

## 2023-01-01 RX ORDER — CHLORHEXIDINE GLUCONATE 213 G/1000ML
1 SOLUTION TOPICAL
Refills: 0 | Status: DISCONTINUED | OUTPATIENT
Start: 2023-01-01 | End: 2023-01-01

## 2023-01-01 RX ORDER — FENTANYL CITRATE 50 UG/ML
25 INJECTION INTRAVENOUS ONCE
Refills: 0 | Status: DISCONTINUED | OUTPATIENT
Start: 2023-01-01 | End: 2023-01-01

## 2023-01-01 RX ORDER — ENOXAPARIN SODIUM 100 MG/ML
40 INJECTION SUBCUTANEOUS EVERY 24 HOURS
Refills: 0 | Status: DISCONTINUED | OUTPATIENT
Start: 2023-01-01 | End: 2023-01-01

## 2023-01-01 RX ORDER — CALCIUM CHLORIDE
1 POWDER (GRAM) MISCELLANEOUS ONCE
Refills: 0 | Status: COMPLETED | OUTPATIENT
Start: 2023-01-01 | End: 2023-01-01

## 2023-01-01 RX ORDER — PROPOFOL 10 MG/ML
100 INJECTION, EMULSION INTRAVENOUS ONCE
Refills: 0 | Status: COMPLETED | OUTPATIENT
Start: 2023-01-01 | End: 2023-01-01

## 2023-01-01 RX ORDER — SODIUM CHLORIDE 9 MG/ML
10 INJECTION INTRAMUSCULAR; INTRAVENOUS; SUBCUTANEOUS
Refills: 0 | Status: DISCONTINUED | OUTPATIENT
Start: 2023-01-01 | End: 2023-01-01

## 2023-01-01 RX ORDER — MORPHINE SULFATE 50 MG/1
1 CAPSULE, EXTENDED RELEASE ORAL
Qty: 100 | Refills: 0 | Status: DISCONTINUED | OUTPATIENT
Start: 2023-01-01 | End: 2023-01-01

## 2023-01-01 RX ORDER — NOREPINEPHRINE BITARTRATE/D5W 8 MG/250ML
0.05 PLASTIC BAG, INJECTION (ML) INTRAVENOUS
Qty: 16 | Refills: 0 | Status: DISCONTINUED | OUTPATIENT
Start: 2023-01-01 | End: 2023-01-01

## 2023-01-01 RX ORDER — PIPERACILLIN AND TAZOBACTAM 4; .5 G/20ML; G/20ML
3.38 INJECTION, POWDER, LYOPHILIZED, FOR SOLUTION INTRAVENOUS ONCE
Refills: 0 | Status: COMPLETED | OUTPATIENT
Start: 2023-01-01 | End: 2023-01-01

## 2023-01-01 RX ORDER — HEPARIN SODIUM 5000 [USP'U]/ML
5000 INJECTION INTRAVENOUS; SUBCUTANEOUS EVERY 12 HOURS
Refills: 0 | Status: DISCONTINUED | OUTPATIENT
Start: 2023-01-01 | End: 2023-01-01

## 2023-01-01 RX ORDER — HEPARIN SODIUM 5000 [USP'U]/ML
INJECTION INTRAVENOUS; SUBCUTANEOUS
Qty: 25000 | Refills: 0 | Status: DISCONTINUED | OUTPATIENT
Start: 2023-01-01 | End: 2023-01-01

## 2023-01-01 RX ORDER — CHLORHEXIDINE GLUCONATE 213 G/1000ML
15 SOLUTION TOPICAL EVERY 12 HOURS
Refills: 0 | Status: DISCONTINUED | OUTPATIENT
Start: 2023-01-01 | End: 2023-01-01

## 2023-01-01 RX ORDER — ACETAMINOPHEN 500 MG
650 TABLET ORAL EVERY 6 HOURS
Refills: 0 | Status: DISCONTINUED | OUTPATIENT
Start: 2023-01-01 | End: 2023-01-01

## 2023-01-01 RX ORDER — ACETAMINOPHEN 500 MG
1000 TABLET ORAL ONCE
Refills: 0 | Status: COMPLETED | OUTPATIENT
Start: 2023-01-01 | End: 2023-01-01

## 2023-01-01 RX ORDER — CEFTRIAXONE 500 MG/1
INJECTION, POWDER, FOR SOLUTION INTRAMUSCULAR; INTRAVENOUS
Refills: 0 | Status: DISCONTINUED | OUTPATIENT
Start: 2023-01-01 | End: 2023-01-01

## 2023-01-01 RX ORDER — DEXMEDETOMIDINE HYDROCHLORIDE IN 0.9% SODIUM CHLORIDE 4 UG/ML
0.2 INJECTION INTRAVENOUS
Qty: 400 | Refills: 0 | Status: DISCONTINUED | OUTPATIENT
Start: 2023-01-01 | End: 2023-01-01

## 2023-01-01 RX ORDER — ATORVASTATIN CALCIUM 80 MG/1
40 TABLET, FILM COATED ORAL AT BEDTIME
Refills: 0 | Status: DISCONTINUED | OUTPATIENT
Start: 2023-01-01 | End: 2023-01-01

## 2023-01-01 RX ORDER — BUMETANIDE 0.25 MG/ML
1 INJECTION INTRAMUSCULAR; INTRAVENOUS ONCE
Refills: 0 | Status: COMPLETED | OUTPATIENT
Start: 2023-01-01 | End: 2023-01-01

## 2023-01-01 RX ORDER — HEPARIN SODIUM 5000 [USP'U]/ML
5000 INJECTION INTRAVENOUS; SUBCUTANEOUS ONCE
Refills: 0 | Status: DISCONTINUED | OUTPATIENT
Start: 2023-01-01 | End: 2023-01-01

## 2023-01-01 RX ORDER — SODIUM CHLORIDE 9 MG/ML
500 INJECTION INTRAMUSCULAR; INTRAVENOUS; SUBCUTANEOUS ONCE
Refills: 0 | Status: COMPLETED | OUTPATIENT
Start: 2023-01-01 | End: 2023-01-01

## 2023-01-01 RX ORDER — PROPOFOL 10 MG/ML
5 INJECTION, EMULSION INTRAVENOUS
Qty: 500 | Refills: 0 | Status: DISCONTINUED | OUTPATIENT
Start: 2023-01-01 | End: 2023-01-01

## 2023-01-01 RX ORDER — CLOPIDOGREL BISULFATE 75 MG/1
75 TABLET, FILM COATED ORAL DAILY
Refills: 0 | Status: DISCONTINUED | OUTPATIENT
Start: 2023-01-01 | End: 2023-01-01

## 2023-01-01 RX ORDER — HEPARIN SODIUM 5000 [USP'U]/ML
6000 INJECTION INTRAVENOUS; SUBCUTANEOUS EVERY 6 HOURS
Refills: 0 | Status: DISCONTINUED | OUTPATIENT
Start: 2023-01-01 | End: 2023-01-01

## 2023-01-01 RX ORDER — FENTANYL CITRATE 50 UG/ML
0.5 INJECTION INTRAVENOUS
Qty: 2500 | Refills: 0 | Status: DISCONTINUED | OUTPATIENT
Start: 2023-01-01 | End: 2023-01-01

## 2023-01-01 RX ORDER — CEFTRIAXONE 500 MG/1
2000 INJECTION, POWDER, FOR SOLUTION INTRAMUSCULAR; INTRAVENOUS ONCE
Refills: 0 | Status: COMPLETED | OUTPATIENT
Start: 2023-01-01 | End: 2023-01-01

## 2023-01-01 RX ORDER — ACETAMINOPHEN 500 MG
1000 TABLET ORAL ONCE
Refills: 0 | Status: DISCONTINUED | OUTPATIENT
Start: 2023-01-01 | End: 2023-01-01

## 2023-01-01 RX ORDER — NOREPINEPHRINE BITARTRATE/D5W 8 MG/250ML
0.05 PLASTIC BAG, INJECTION (ML) INTRAVENOUS
Qty: 8 | Refills: 0 | Status: DISCONTINUED | OUTPATIENT
Start: 2023-01-01 | End: 2023-01-01

## 2023-01-01 RX ORDER — ASPIRIN/CALCIUM CARB/MAGNESIUM 324 MG
300 TABLET ORAL ONCE
Refills: 0 | Status: COMPLETED | OUTPATIENT
Start: 2023-01-01 | End: 2023-01-01

## 2023-01-01 RX ORDER — ASPIRIN/CALCIUM CARB/MAGNESIUM 324 MG
81 TABLET ORAL DAILY
Refills: 0 | Status: DISCONTINUED | OUTPATIENT
Start: 2023-01-01 | End: 2023-01-01

## 2023-01-01 RX ORDER — MAGNESIUM SULFATE 500 MG/ML
3 VIAL (ML) INJECTION ONCE
Refills: 0 | Status: COMPLETED | OUTPATIENT
Start: 2023-01-01 | End: 2023-01-01

## 2023-01-01 RX ORDER — MORPHINE SULFATE 50 MG/1
4 CAPSULE, EXTENDED RELEASE ORAL ONCE
Refills: 0 | Status: DISCONTINUED | OUTPATIENT
Start: 2023-01-01 | End: 2023-01-01

## 2023-01-01 RX ORDER — PIPERACILLIN AND TAZOBACTAM 4; .5 G/20ML; G/20ML
3.38 INJECTION, POWDER, LYOPHILIZED, FOR SOLUTION INTRAVENOUS EVERY 6 HOURS
Refills: 0 | Status: DISCONTINUED | OUTPATIENT
Start: 2023-01-01 | End: 2023-01-01

## 2023-01-01 RX ORDER — MORPHINE SULFATE 50 MG/1
4 CAPSULE, EXTENDED RELEASE ORAL
Refills: 0 | Status: DISCONTINUED | OUTPATIENT
Start: 2023-01-01 | End: 2023-01-01

## 2023-01-01 RX ORDER — ACETAMINOPHEN 500 MG
650 TABLET ORAL ONCE
Refills: 0 | Status: COMPLETED | OUTPATIENT
Start: 2023-01-01 | End: 2023-01-01

## 2023-01-01 RX ORDER — PROPOFOL 10 MG/ML
10 INJECTION, EMULSION INTRAVENOUS
Qty: 1000 | Refills: 0 | Status: DISCONTINUED | OUTPATIENT
Start: 2023-01-01 | End: 2023-01-01

## 2023-01-01 RX ORDER — CEFEPIME 1 G/1
2000 INJECTION, POWDER, FOR SOLUTION INTRAMUSCULAR; INTRAVENOUS ONCE
Refills: 0 | Status: COMPLETED | OUTPATIENT
Start: 2023-01-01 | End: 2023-01-01

## 2023-01-01 RX ORDER — MAGNESIUM SULFATE 500 MG/ML
1 VIAL (ML) INJECTION ONCE
Refills: 0 | Status: COMPLETED | OUTPATIENT
Start: 2023-01-01 | End: 2023-01-01

## 2023-01-01 RX ORDER — POLYETHYLENE GLYCOL 3350 17 G/17G
17 POWDER, FOR SOLUTION ORAL DAILY
Refills: 0 | Status: DISCONTINUED | OUTPATIENT
Start: 2023-01-01 | End: 2023-01-01

## 2023-01-01 RX ORDER — SODIUM CHLORIDE 9 MG/ML
500 INJECTION, SOLUTION INTRAVENOUS ONCE
Refills: 0 | Status: COMPLETED | OUTPATIENT
Start: 2023-01-01 | End: 2023-01-01

## 2023-01-01 RX ORDER — SODIUM BICARBONATE 1 MEQ/ML
50 SYRINGE (ML) INTRAVENOUS ONCE
Refills: 0 | Status: COMPLETED | OUTPATIENT
Start: 2023-01-01 | End: 2023-01-01

## 2023-01-01 RX ORDER — METRONIDAZOLE 500 MG
500 TABLET ORAL EVERY 8 HOURS
Refills: 0 | Status: DISCONTINUED | OUTPATIENT
Start: 2023-01-01 | End: 2023-01-01

## 2023-01-01 RX ORDER — ROBINUL 0.2 MG/ML
0.4 INJECTION INTRAMUSCULAR; INTRAVENOUS ONCE
Refills: 0 | Status: COMPLETED | OUTPATIENT
Start: 2023-01-01 | End: 2023-01-01

## 2023-01-01 RX ORDER — EPINEPHRINE 0.3 MG/.3ML
0.03 INJECTION INTRAMUSCULAR; SUBCUTANEOUS
Qty: 8 | Refills: 0 | Status: DISCONTINUED | OUTPATIENT
Start: 2023-01-01 | End: 2023-01-01

## 2023-01-01 RX ORDER — CLOPIDOGREL BISULFATE 75 MG/1
300 TABLET, FILM COATED ORAL ONCE
Refills: 0 | Status: DISCONTINUED | OUTPATIENT
Start: 2023-01-01 | End: 2023-01-01

## 2023-01-01 RX ORDER — VANCOMYCIN HCL 1 G
1000 VIAL (EA) INTRAVENOUS ONCE
Refills: 0 | Status: COMPLETED | OUTPATIENT
Start: 2023-01-01 | End: 2023-01-01

## 2023-01-01 RX ORDER — VECURONIUM BROMIDE 20 MG/1
10 INJECTION, POWDER, FOR SOLUTION INTRAVENOUS ONCE
Refills: 0 | Status: COMPLETED | OUTPATIENT
Start: 2023-01-01 | End: 2023-01-01

## 2023-01-01 RX ADMIN — Medication 650 MILLIGRAM(S): at 03:45

## 2023-01-01 RX ADMIN — Medication 1 MILLIGRAM(S): at 20:13

## 2023-01-01 RX ADMIN — CEFTRIAXONE 100 MILLIGRAM(S): 500 INJECTION, POWDER, FOR SOLUTION INTRAMUSCULAR; INTRAVENOUS at 16:46

## 2023-01-01 RX ADMIN — Medication 50 MILLIEQUIVALENT(S): at 07:29

## 2023-01-01 RX ADMIN — Medication 100 MILLIGRAM(S): at 21:35

## 2023-01-01 RX ADMIN — PIPERACILLIN AND TAZOBACTAM 25 GRAM(S): 4; .5 INJECTION, POWDER, LYOPHILIZED, FOR SOLUTION INTRAVENOUS at 05:16

## 2023-01-01 RX ADMIN — ATORVASTATIN CALCIUM 40 MILLIGRAM(S): 80 TABLET, FILM COATED ORAL at 21:31

## 2023-01-01 RX ADMIN — CEFTRIAXONE 100 MILLIGRAM(S): 500 INJECTION, POWDER, FOR SOLUTION INTRAMUSCULAR; INTRAVENOUS at 16:51

## 2023-01-01 RX ADMIN — BUMETANIDE 1 MILLIGRAM(S): 0.25 INJECTION INTRAMUSCULAR; INTRAVENOUS at 18:24

## 2023-01-01 RX ADMIN — Medication 5.16 MICROGRAM(S)/KG/MIN: at 12:22

## 2023-01-01 RX ADMIN — CLOPIDOGREL BISULFATE 75 MILLIGRAM(S): 75 TABLET, FILM COATED ORAL at 12:22

## 2023-01-01 RX ADMIN — SENNA PLUS 2 TABLET(S): 8.6 TABLET ORAL at 22:11

## 2023-01-01 RX ADMIN — PIPERACILLIN AND TAZOBACTAM 25 GRAM(S): 4; .5 INJECTION, POWDER, LYOPHILIZED, FOR SOLUTION INTRAVENOUS at 17:13

## 2023-01-01 RX ADMIN — PROPOFOL 6.6 MICROGRAM(S)/KG/MIN: 10 INJECTION, EMULSION INTRAVENOUS at 05:13

## 2023-01-01 RX ADMIN — Medication 30 MILLIGRAM(S): at 17:23

## 2023-01-01 RX ADMIN — Medication 30 MILLIGRAM(S): at 14:13

## 2023-01-01 RX ADMIN — Medication 5.16 MICROGRAM(S)/KG/MIN: at 19:16

## 2023-01-01 RX ADMIN — CHLORHEXIDINE GLUCONATE 15 MILLILITER(S): 213 SOLUTION TOPICAL at 17:12

## 2023-01-01 RX ADMIN — Medication 1000 MILLIGRAM(S): at 02:04

## 2023-01-01 RX ADMIN — CLOPIDOGREL BISULFATE 75 MILLIGRAM(S): 75 TABLET, FILM COATED ORAL at 11:40

## 2023-01-01 RX ADMIN — FENTANYL CITRATE 3.97 MICROGRAM(S)/KG/HR: 50 INJECTION INTRAVENOUS at 20:42

## 2023-01-01 RX ADMIN — PIPERACILLIN AND TAZOBACTAM 25 GRAM(S): 4; .5 INJECTION, POWDER, LYOPHILIZED, FOR SOLUTION INTRAVENOUS at 12:09

## 2023-01-01 RX ADMIN — MIDAZOLAM HYDROCHLORIDE 2.2 MG/KG/HR: 1 INJECTION, SOLUTION INTRAMUSCULAR; INTRAVENOUS at 04:02

## 2023-01-01 RX ADMIN — Medication 100 GRAM(S): at 05:20

## 2023-01-01 RX ADMIN — HEPARIN SODIUM 5000 UNIT(S): 5000 INJECTION INTRAVENOUS; SUBCUTANEOUS at 17:17

## 2023-01-01 RX ADMIN — Medication 400 MILLIGRAM(S): at 20:48

## 2023-01-01 RX ADMIN — MORPHINE SULFATE 4 MILLIGRAM(S): 50 CAPSULE, EXTENDED RELEASE ORAL at 13:45

## 2023-01-01 RX ADMIN — Medication 30 MILLIGRAM(S): at 14:24

## 2023-01-01 RX ADMIN — FENTANYL CITRATE 5.5 MICROGRAM(S)/KG/HR: 50 INJECTION INTRAVENOUS at 05:32

## 2023-01-01 RX ADMIN — FENTANYL CITRATE 25 MICROGRAM(S): 50 INJECTION INTRAVENOUS at 05:18

## 2023-01-01 RX ADMIN — Medication 5.16 MICROGRAM(S)/KG/MIN: at 20:18

## 2023-01-01 RX ADMIN — HEPARIN SODIUM 1000 UNIT(S)/HR: 5000 INJECTION INTRAVENOUS; SUBCUTANEOUS at 09:18

## 2023-01-01 RX ADMIN — INSULIN HUMAN 10 UNIT(S): 100 INJECTION, SOLUTION SUBCUTANEOUS at 07:21

## 2023-01-01 RX ADMIN — Medication 100 MILLIGRAM(S): at 13:22

## 2023-01-01 RX ADMIN — Medication 650 MILLIGRAM(S): at 03:00

## 2023-01-01 RX ADMIN — PIPERACILLIN AND TAZOBACTAM 25 GRAM(S): 4; .5 INJECTION, POWDER, LYOPHILIZED, FOR SOLUTION INTRAVENOUS at 00:10

## 2023-01-01 RX ADMIN — FENTANYL CITRATE 3.97 MICROGRAM(S)/KG/HR: 50 INJECTION INTRAVENOUS at 08:38

## 2023-01-01 RX ADMIN — LEVETIRACETAM 400 MILLIGRAM(S): 250 TABLET, FILM COATED ORAL at 17:14

## 2023-01-01 RX ADMIN — ATORVASTATIN CALCIUM 40 MILLIGRAM(S): 80 TABLET, FILM COATED ORAL at 21:13

## 2023-01-01 RX ADMIN — CHLORHEXIDINE GLUCONATE 1 APPLICATION(S): 213 SOLUTION TOPICAL at 06:41

## 2023-01-01 RX ADMIN — PROPOFOL 2.38 MICROGRAM(S)/KG/MIN: 10 INJECTION, EMULSION INTRAVENOUS at 07:18

## 2023-01-01 RX ADMIN — MIDAZOLAM HYDROCHLORIDE 1.59 MG/KG/HR: 1 INJECTION, SOLUTION INTRAMUSCULAR; INTRAVENOUS at 20:43

## 2023-01-01 RX ADMIN — VECURONIUM BROMIDE 10 MILLIGRAM(S): 20 INJECTION, POWDER, FOR SOLUTION INTRAVENOUS at 08:38

## 2023-01-01 RX ADMIN — CHLORHEXIDINE GLUCONATE 15 MILLILITER(S): 213 SOLUTION TOPICAL at 05:15

## 2023-01-01 RX ADMIN — Medication 81 MILLIGRAM(S): at 11:40

## 2023-01-01 RX ADMIN — Medication 3 GRAM(S): at 07:22

## 2023-01-01 RX ADMIN — Medication 650 MILLIGRAM(S): at 03:15

## 2023-01-01 RX ADMIN — Medication 650 MILLIGRAM(S): at 20:45

## 2023-01-01 RX ADMIN — Medication 250 MILLIGRAM(S): at 12:23

## 2023-01-01 RX ADMIN — MORPHINE SULFATE 4 MILLIGRAM(S): 50 CAPSULE, EXTENDED RELEASE ORAL at 13:00

## 2023-01-01 RX ADMIN — Medication 30 MILLIGRAM(S): at 05:15

## 2023-01-01 RX ADMIN — Medication 81 MILLIGRAM(S): at 12:23

## 2023-01-01 RX ADMIN — PIPERACILLIN AND TAZOBACTAM 200 GRAM(S): 4; .5 INJECTION, POWDER, LYOPHILIZED, FOR SOLUTION INTRAVENOUS at 10:55

## 2023-01-01 RX ADMIN — ENOXAPARIN SODIUM 40 MILLIGRAM(S): 100 INJECTION SUBCUTANEOUS at 14:45

## 2023-01-01 RX ADMIN — FENTANYL CITRATE 5.5 MICROGRAM(S)/KG/HR: 50 INJECTION INTRAVENOUS at 15:19

## 2023-01-01 RX ADMIN — PROPOFOL 6.6 MICROGRAM(S)/KG/MIN: 10 INJECTION, EMULSION INTRAVENOUS at 20:18

## 2023-01-01 RX ADMIN — CEFTRIAXONE 100 MILLIGRAM(S): 500 INJECTION, POWDER, FOR SOLUTION INTRAMUSCULAR; INTRAVENOUS at 18:24

## 2023-01-01 RX ADMIN — PIPERACILLIN AND TAZOBACTAM 25 GRAM(S): 4; .5 INJECTION, POWDER, LYOPHILIZED, FOR SOLUTION INTRAVENOUS at 12:26

## 2023-01-01 RX ADMIN — FENTANYL CITRATE 3.97 MICROGRAM(S)/KG/HR: 50 INJECTION INTRAVENOUS at 13:46

## 2023-01-01 RX ADMIN — CLOPIDOGREL BISULFATE 75 MILLIGRAM(S): 75 TABLET, FILM COATED ORAL at 12:27

## 2023-01-01 RX ADMIN — Medication 100 MILLIGRAM(S): at 05:11

## 2023-01-01 RX ADMIN — FENTANYL CITRATE 5.5 MICROGRAM(S)/KG/HR: 50 INJECTION INTRAVENOUS at 19:16

## 2023-01-01 RX ADMIN — HEPARIN SODIUM 1000 UNIT(S)/HR: 5000 INJECTION INTRAVENOUS; SUBCUTANEOUS at 02:36

## 2023-01-01 RX ADMIN — Medication 650 MILLIGRAM(S): at 18:00

## 2023-01-01 RX ADMIN — ATORVASTATIN CALCIUM 40 MILLIGRAM(S): 80 TABLET, FILM COATED ORAL at 22:05

## 2023-01-01 RX ADMIN — Medication 30 MILLIGRAM(S): at 05:21

## 2023-01-01 RX ADMIN — HEPARIN SODIUM 1400 UNIT(S)/HR: 5000 INJECTION INTRAVENOUS; SUBCUTANEOUS at 02:24

## 2023-01-01 RX ADMIN — HEPARIN SODIUM 5000 UNIT(S): 5000 INJECTION INTRAVENOUS; SUBCUTANEOUS at 05:17

## 2023-01-01 RX ADMIN — CHLORHEXIDINE GLUCONATE 1 APPLICATION(S): 213 SOLUTION TOPICAL at 10:02

## 2023-01-01 RX ADMIN — MIDAZOLAM HYDROCHLORIDE 2.2 MG/KG/HR: 1 INJECTION, SOLUTION INTRAMUSCULAR; INTRAVENOUS at 01:34

## 2023-01-01 RX ADMIN — HEPARIN SODIUM 1000 UNIT(S)/HR: 5000 INJECTION INTRAVENOUS; SUBCUTANEOUS at 19:07

## 2023-01-01 RX ADMIN — Medication 1 MILLIGRAM(S): at 10:36

## 2023-01-01 RX ADMIN — Medication 81 MILLIGRAM(S): at 12:16

## 2023-01-01 RX ADMIN — Medication 650 MILLIGRAM(S): at 02:52

## 2023-01-01 RX ADMIN — FENTANYL CITRATE 5.5 MICROGRAM(S)/KG/HR: 50 INJECTION INTRAVENOUS at 08:35

## 2023-01-01 RX ADMIN — SODIUM CHLORIDE 1000 MILLILITER(S): 9 INJECTION, SOLUTION INTRAVENOUS at 10:01

## 2023-01-01 RX ADMIN — PIPERACILLIN AND TAZOBACTAM 25 GRAM(S): 4; .5 INJECTION, POWDER, LYOPHILIZED, FOR SOLUTION INTRAVENOUS at 05:23

## 2023-01-01 RX ADMIN — SODIUM CHLORIDE 1000 MILLILITER(S): 9 INJECTION INTRAMUSCULAR; INTRAVENOUS; SUBCUTANEOUS at 17:00

## 2023-01-01 RX ADMIN — PIPERACILLIN AND TAZOBACTAM 25 GRAM(S): 4; .5 INJECTION, POWDER, LYOPHILIZED, FOR SOLUTION INTRAVENOUS at 01:21

## 2023-01-01 RX ADMIN — CLOPIDOGREL BISULFATE 75 MILLIGRAM(S): 75 TABLET, FILM COATED ORAL at 12:17

## 2023-01-01 RX ADMIN — PIPERACILLIN AND TAZOBACTAM 25 GRAM(S): 4; .5 INJECTION, POWDER, LYOPHILIZED, FOR SOLUTION INTRAVENOUS at 00:51

## 2023-01-01 RX ADMIN — MORPHINE SULFATE 4 MILLIGRAM(S): 50 CAPSULE, EXTENDED RELEASE ORAL at 13:24

## 2023-01-01 RX ADMIN — Medication 81 MILLIGRAM(S): at 12:27

## 2023-01-01 RX ADMIN — Medication 1 MILLIGRAM(S): at 11:21

## 2023-01-01 RX ADMIN — MIDAZOLAM HYDROCHLORIDE 2.2 MG/KG/HR: 1 INJECTION, SOLUTION INTRAMUSCULAR; INTRAVENOUS at 19:16

## 2023-01-01 RX ADMIN — Medication 5.16 MICROGRAM(S)/KG/MIN: at 05:32

## 2023-01-01 RX ADMIN — FENTANYL CITRATE 5.5 MICROGRAM(S)/KG/HR: 50 INJECTION INTRAVENOUS at 16:51

## 2023-01-01 RX ADMIN — Medication 1 MILLIGRAM(S): at 07:22

## 2023-01-01 RX ADMIN — Medication 650 MILLIGRAM(S): at 17:17

## 2023-01-01 RX ADMIN — PROPOFOL 2.38 MICROGRAM(S)/KG/MIN: 10 INJECTION, EMULSION INTRAVENOUS at 14:14

## 2023-01-01 RX ADMIN — MORPHINE SULFATE 4 MILLIGRAM(S): 50 CAPSULE, EXTENDED RELEASE ORAL at 13:39

## 2023-01-01 RX ADMIN — CHLORHEXIDINE GLUCONATE 1 APPLICATION(S): 213 SOLUTION TOPICAL at 05:24

## 2023-01-01 RX ADMIN — Medication 5.16 MICROGRAM(S)/KG/MIN: at 08:54

## 2023-01-01 RX ADMIN — PROPOFOL 100 MILLIGRAM(S): 10 INJECTION, EMULSION INTRAVENOUS at 07:28

## 2023-01-01 RX ADMIN — Medication 1 MILLIGRAM(S): at 15:08

## 2023-01-01 RX ADMIN — ATORVASTATIN CALCIUM 40 MILLIGRAM(S): 80 TABLET, FILM COATED ORAL at 21:30

## 2023-01-01 RX ADMIN — Medication 100 MILLIGRAM(S): at 21:31

## 2023-01-01 RX ADMIN — Medication 100 MILLIGRAM(S): at 06:21

## 2023-01-01 RX ADMIN — Medication 100 MILLIGRAM(S): at 05:20

## 2023-01-01 RX ADMIN — Medication 30 MILLIGRAM(S): at 05:23

## 2023-01-01 RX ADMIN — MIDAZOLAM HYDROCHLORIDE 1.59 MG/KG/HR: 1 INJECTION, SOLUTION INTRAMUSCULAR; INTRAVENOUS at 14:46

## 2023-01-01 RX ADMIN — MORPHINE SULFATE 4 MILLIGRAM(S): 50 CAPSULE, EXTENDED RELEASE ORAL at 13:30

## 2023-01-01 RX ADMIN — Medication 650 MILLIGRAM(S): at 14:50

## 2023-01-01 RX ADMIN — FENTANYL CITRATE 5.5 MICROGRAM(S)/KG/HR: 50 INJECTION INTRAVENOUS at 00:06

## 2023-01-01 RX ADMIN — Medication 5.16 MICROGRAM(S)/KG/MIN: at 19:08

## 2023-01-01 RX ADMIN — MIDAZOLAM HYDROCHLORIDE 1.59 MG/KG/HR: 1 INJECTION, SOLUTION INTRAMUSCULAR; INTRAVENOUS at 08:39

## 2023-01-01 RX ADMIN — MORPHINE SULFATE 4 MILLIGRAM(S): 50 CAPSULE, EXTENDED RELEASE ORAL at 12:11

## 2023-01-01 RX ADMIN — Medication 260 MILLIGRAM(S): at 19:09

## 2023-01-01 RX ADMIN — Medication 10.3 MICROGRAM(S)/KG/MIN: at 07:40

## 2023-01-01 RX ADMIN — MIDAZOLAM HYDROCHLORIDE 2.2 MG/KG/HR: 1 INJECTION, SOLUTION INTRAMUSCULAR; INTRAVENOUS at 08:34

## 2023-01-01 RX ADMIN — Medication 400 MILLIGRAM(S): at 01:34

## 2023-01-01 RX ADMIN — Medication 10.3 MICROGRAM(S)/KG/MIN: at 02:34

## 2023-01-01 RX ADMIN — CHLORHEXIDINE GLUCONATE 1 APPLICATION(S): 213 SOLUTION TOPICAL at 09:24

## 2023-01-01 RX ADMIN — Medication 10.3 MICROGRAM(S)/KG/MIN: at 13:26

## 2023-01-01 RX ADMIN — Medication 100 MILLIGRAM(S): at 14:25

## 2023-01-01 RX ADMIN — Medication 650 MILLIGRAM(S): at 15:20

## 2023-01-01 RX ADMIN — FENTANYL CITRATE 25 MICROGRAM(S): 50 INJECTION INTRAVENOUS at 05:48

## 2023-01-01 RX ADMIN — Medication 30 MILLIGRAM(S): at 05:11

## 2023-01-01 RX ADMIN — Medication 10.3 MICROGRAM(S)/KG/MIN: at 18:24

## 2023-01-01 RX ADMIN — HEPARIN SODIUM 1200 UNIT(S)/HR: 5000 INJECTION INTRAVENOUS; SUBCUTANEOUS at 19:15

## 2023-01-01 RX ADMIN — Medication 10.3 MICROGRAM(S)/KG/MIN: at 20:43

## 2023-01-01 RX ADMIN — PIPERACILLIN AND TAZOBACTAM 25 GRAM(S): 4; .5 INJECTION, POWDER, LYOPHILIZED, FOR SOLUTION INTRAVENOUS at 17:21

## 2023-01-01 RX ADMIN — Medication 1 MILLIGRAM(S): at 02:56

## 2023-01-01 RX ADMIN — CHLORHEXIDINE GLUCONATE 15 MILLILITER(S): 213 SOLUTION TOPICAL at 07:45

## 2023-01-01 RX ADMIN — Medication 30 MILLIGRAM(S): at 22:06

## 2023-01-01 RX ADMIN — Medication 100 MILLIGRAM(S): at 23:01

## 2023-01-01 RX ADMIN — PIPERACILLIN AND TAZOBACTAM 25 GRAM(S): 4; .5 INJECTION, POWDER, LYOPHILIZED, FOR SOLUTION INTRAVENOUS at 05:21

## 2023-01-01 RX ADMIN — ROBINUL 0.4 MILLIGRAM(S): 0.2 INJECTION INTRAMUSCULAR; INTRAVENOUS at 12:11

## 2023-01-01 RX ADMIN — CHLORHEXIDINE GLUCONATE 1 APPLICATION(S): 213 SOLUTION TOPICAL at 14:50

## 2023-01-01 RX ADMIN — Medication 5.16 MICROGRAM(S)/KG/MIN: at 21:17

## 2023-01-01 RX ADMIN — Medication 650 MILLIGRAM(S): at 21:19

## 2023-01-01 RX ADMIN — Medication 30 MILLIGRAM(S): at 11:40

## 2023-01-01 RX ADMIN — ATORVASTATIN CALCIUM 40 MILLIGRAM(S): 80 TABLET, FILM COATED ORAL at 23:00

## 2023-01-01 RX ADMIN — Medication 1000 MILLIGRAM(S): at 21:18

## 2023-01-01 RX ADMIN — Medication 30 MILLIGRAM(S): at 21:13

## 2023-01-01 RX ADMIN — Medication 250 MILLIGRAM(S): at 14:46

## 2023-01-01 RX ADMIN — Medication 300 MILLIGRAM(S): at 20:56

## 2023-01-01 RX ADMIN — CEFEPIME 100 MILLIGRAM(S): 1 INJECTION, POWDER, FOR SOLUTION INTRAMUSCULAR; INTRAVENOUS at 09:36

## 2023-01-01 RX ADMIN — Medication 30 MILLIGRAM(S): at 21:55

## 2023-01-01 RX ADMIN — HEPARIN SODIUM 1000 UNIT(S)/HR: 5000 INJECTION INTRAVENOUS; SUBCUTANEOUS at 05:32

## 2023-01-01 RX ADMIN — Medication 2 MILLIGRAM(S): at 12:23

## 2023-01-01 RX ADMIN — LEVETIRACETAM 400 MILLIGRAM(S): 250 TABLET, FILM COATED ORAL at 05:16

## 2023-01-01 RX ADMIN — Medication 30 MILLIGRAM(S): at 21:29

## 2023-01-01 RX ADMIN — POLYETHYLENE GLYCOL 3350 17 GRAM(S): 17 POWDER, FOR SOLUTION ORAL at 12:28

## 2023-01-20 NOTE — H&P ADULT - ASSESSMENT
IMPRESSION:  Acute hypoxemic respiratory failure  S/p CPA x45min, 13 rounds of epi, 10 shocks  Multifocal pneumonia  Acute respiratory distress syndrome  Sepsis present on admission    PLAN:    CNS: Adequate sedation for 24hrs. Currently on fentanyl, propofol, and versed. Check neuron specific enolase. SAT tomorrow. EEG tomorrow when off sedation if nonresponsive.     HEENT: Oral care    PULMONARY:  HOB @ 45 degrees. Vent changes as follows: ARDS network protocol. 6cc/kg ideal body weight tidal volume. Target SpO2 92-96%, titrate oxygen as tolerated. Target Peak<45, target plateau<30, target DP<15. Increase PEEP to 8 if pressures allow.  Inc RR to 24. Repeat ABG in PM.     CARDIOVASCULAR: ECHO. Trend CE. Avoid volume overload. BNP. Bumex 1mg IV x1 dose.     GI: GI prophylaxis.     RENAL:  Follow up lytes.  Correct as needed. Ray.     INFECTIOUS DISEASE: PanCx. Procal. Fungitell. Nasal MRSA. DTA. UA. Serum galactomannan. Urine strep/legionella. 2 sets of BCx. Full RVP. Vancomycin, cefepime, and levofloxacin for now. ID eval.     HEMATOLOGICAL:  DVT prophylaxis. Dimer, LE venous duplex.     ENDOCRINE:  Follow up FS. Target -180. TSH.     MUSCULOSKELETAL: bedrest    Radial arterial line placed    MICU    Overall poor prognosis

## 2023-01-20 NOTE — ED ADULT NURSE NOTE - NSIMPLEMENTINTERV_GEN_ALL_ED
Implemented All Fall Risk Interventions:  [place] to call system. Call bell, personal items and telephone within reach. Instruct patient to call for assistance. Room bathroom lighting operational. Non-slip footwear when patient is off stretcher. Physically safe environment: no spills, clutter or unnecessary equipment. Stretcher in lowest position, wheels locked, appropriate side rails in place. Provide visual cue, wrist band, yellow gown, etc. Monitor gait and stability. Monitor for mental status changes and reorient to person, place, and time. Review medications for side effects contributing to fall risk. Reinforce activity limits and safety measures with patient and family.

## 2023-01-20 NOTE — CONSULT NOTE ADULT - ATTENDING COMMENTS
Attending Statement: I have personally performed a face to face diagnostic evaluation on this patient. The patient is suffering from:  Acute hypoxemic respiratory failure  cardio pulmonary arrest  x45min, 13 rounds of epi, 10 shocks  prolonged down time  Multifocal pneumonia possible aspiration   Acute respiratory distress syndrome  Sepsis present on admission  likely anoxic brain damage    I have made amendments to the documentation where necessary. I have personally seen and examined this patient.  I have fully participated in the care of this patient.  I have reviewed all pertinent clinical information, including history, physical exam, plan and note.

## 2023-01-20 NOTE — ED PROVIDER NOTE - CLINICAL SUMMARY MEDICAL DECISION MAKING FREE TEXT BOX
67-year-old female stranger to medical care for the last 2 decades brought in by EMS status post cardiac arrest, found gurgling by , received defibrillation x10 with a total of 450 mg of amiodarone given and 3 epinephrines, on arrival in a wide-complex rhythm, given 3 g of magnesium, calcium, insulin, bicarbonate, with narrowing of complexes, was overbreathing vent and biting tube requiring sedation, had blood from ET tube approximately 200 cc of which was suctioned, patient with difficult oxygenation until paralyzed and sat upright, labs and studies appreciated and discussed with ICU (they will follow results of chest and abdomen CT), will cover with antibiotics and admit, family aware of poor prognosis

## 2023-01-20 NOTE — ED PROVIDER NOTE - NS ED ATTENDING STATEMENT MOD
This was a shared visit with the CONRADO. I reviewed and verified the documentation and independently performed the documented:

## 2023-01-20 NOTE — PATIENT PROFILE ADULT - FALL HARM RISK - HARM RISK INTERVENTIONS

## 2023-01-20 NOTE — ED PROVIDER NOTE - PHYSICAL EXAMINATION
GENERAL: Well-nourished, Well-developed. NAD.  HEAD: No visible or palpable bumps or hematomas. No ecchymosis behind ears B/L.  Eyes: PERRLA  CVS: (+)tachycardia  RESP: (+)intubated, ETT confirmed with glidescope, blood in tube.   GI: Normal auscultation of bowel sounds in all 4 quadrants. Soft, Nontender,Nondistended. No guarding or rebound tenderness. No CVAT B/L.  Skin: Warm, Dry. No rashes or lesions. Good cap refill < 2 sec B/L.  Neuro: moving extremities, withdraw from pain GENERAL: obese, intubated. post cardiac arrest  HEAD: No visible or palpable bumps or hematomas. No ecchymosis behind ears B/L.  Eyes: PERRLA  CVS: (+)tachycardia  RESP: (+)intubated, ETT confirmed with glidescope, blood in tube.   GI: Normal auscultation of bowel sounds in all 4 quadrants. Soft, Nontender,Nondistended. No guarding or rebound tenderness. No CVAT B/L.  Skin: Warm, Dry. No rashes or lesions. Good cap refill < 2 sec B/L.  Neuro: moving extremities, withdraw from pain

## 2023-01-20 NOTE — CONSULT NOTE ADULT - ASSESSMENT
IMPRESSION:  Acute hypoxemic respiratory failure  S/p CPA x45min, 13 rounds of epi, 10 shocks  Multifocal pneumonia  Acute respiratory distress syndrome  Sepsis present on admission    PLAN:    CNS: Adequate sedation for 24hrs. Currently on fentanyl, propofol, and versed. Check neuron specific enolase. SAT tomorrow. EEG tomorrow when off sedation if nonresponsive.     HEENT: Oral care    PULMONARY:  HOB @ 45 degrees.  Please document height. Vent changes as follows: ARDS network protocol. 6cc/kg ideal body weight tidal volume. Target SpO2 92-96%, titrate oxygen as tolerated. Target Peak<45, target plateau<30, target DP<15. Increase PEEP to 8 if pressures allow.  Inc RR to 24. Repeat ABG in PM.     CARDIOVASCULAR: ECHO. Trend CE. Avoid volume overload. BNP. Bume x1mg IV x1 dose.     GI: GI prophylaxis.     RENAL:  Follow up lytes.  Correct as needed. Ray.     INFECTIOUS DISEASE: PanCx. Procal. Fungitell. Nasal MRSA. DTA. UA. Serum galactomannan. Urine strep/legionella. 2 sets of BCx. Full RVP. Vancomycin, cefepime, and levofloxacin for now. ID eval.     HEMATOLOGICAL:  DVT prophylaxis. Dimer, LE venous duplex.     ENDOCRINE:  Follow up FS. Target -180. TSH.     MUSCULOSKELETAL: bedrest    Place A-line    MICU    Overall poor prognosis   IMPRESSION:  Acute hypoxemic respiratory failure  cardio pulmonary arrest  x45min, 13 rounds of epi, 10 shocks  prolonged down time  Multifocal pneumonia possible aspiration   Acute respiratory distress syndrome  Sepsis present on admission  likely anoxic brain damage    PLAN:    CNS: Adequate sedation for 24hrs. Currently on fentanyl, propofol, and versed. Check neuron specific enolase. Paralytic only if needed   SAT tomorrow. EEG tomorrow when off sedation if nonresponsive.     HEENT: Oral care    PULMONARY:  HOB @ 45 degrees.  Please document height. Vent changes as follows: ARDS network protocol. 6cc/kg ideal body weight tidal volume. Target SpO2 92-96%, titrate oxygen as tolerated. Target Peak<45, target plateau<30, target DP<15.  Increase PEEP to 8 if pressures allow.    Inc RR to 24.  Repeat ABG in PM.   insert A line    CARDIOVASCULAR: ECHO. Trend CE.   Avoid volume overload. BNP.   Bumex x1mg IV x1 dose.   cardiac management    GI: GI prophylaxis. NPO    RENAL:  Follow up lytes.  Correct as needed. Ray.     INFECTIOUS DISEASE: PanCx. Procal. Fungitell.   Nasal MRSA. DTA.   UA.   Serum galactomannan.   Urine strep/legionella. 2 sets of BCx. Full RVP.   Vancomycin, cefepime, and levofloxacin    ID eval.     HEMATOLOGICAL:  DVT prophylaxis. Dimer, LE venous duplex.     ENDOCRINE:  Follow up FS. Target -180. TSH.     MUSCULOSKELETAL: bedrest    Place A-line    MICU    45 minutes of critical care time spent providing medical care for patient's acute illness/conditions that impairs many  vital organ system and poses a high risk of imminent or life threatening deterioration in the patient's condition. It includes time spent evaluating and treating the patient's acute illness as well as time spent reviewing labs, radiology, discussing  the case with a multidisciplinary team in an effort to prevent further life threatening deterioration or end organ damage. This time is independent of any procedures performed.     IMPRESSION:  Acute hypoxemic respiratory failure  cardio pulmonary arrest  x45min, 13 rounds of epi, 10 shocks  prolonged down time  Multifocal pneumonia possible aspiration   Acute respiratory distress syndrome  Sepsis present on admission  likely anoxic brain damage    PLAN:    CNS: Adequate sedation for 24hrs. Currently on fentanyl, propofol, and versed. Check neuron specific enolase. Paralytic only if needed   SAT tomorrow. EEG tomorrow when off sedation if nonresponsive.     HEENT: Oral care    PULMONARY:  HOB @ 45 degrees. adjust ETT  Please document height. Vent changes as follows: ARDS network protocol. 6cc/kg ideal body weight tidal volume. Target SpO2 92-96%, titrate oxygen as tolerated. Target Peak<45, target plateau<30, target DP<15.  Increase PEEP to 8 if pressures allow.    Inc RR to 24.  Repeat ABG in PM.   insert A line    CARDIOVASCULAR: ECHO. Trend CE.   Avoid volume overload. BNP.   Bumex x1mg IV x1 dose.   cardiac management    GI: GI prophylaxis. NPO    RENAL:  Follow up lytes.  Correct as needed. Ray.     INFECTIOUS DISEASE: PanCx. Procal. Fungitell.   Nasal MRSA. DTA.   UA.   Serum galactomannan.   Urine strep/legionella. 2 sets of BCx. Full RVP.   Vancomycin, cefepime, and levofloxacin   ID eval.     HEMATOLOGICAL:  DVT prophylaxis. Dimer, LE venous duplex.     ENDOCRINE:  Follow up FS. Target -180. TSH.     MUSCULOSKELETAL: bedrest    Place A-line    MICU    45 minutes of critical care time spent providing medical care for patient's acute illness/conditions that impairs many  vital organ system and poses a high risk of imminent or life threatening deterioration in the patient's condition. It includes time spent evaluating and treating the patient's acute illness as well as time spent reviewing labs, radiology, discussing  the case with a multidisciplinary team in an effort to prevent further life threatening deterioration or end organ damage. This time is independent of any procedures performed.

## 2023-01-20 NOTE — ED ADULT TRIAGE NOTE - CHIEF COMPLAINT QUOTE
BIBA as adult cardiac arrest notification after being found by spouse making gurgling noises in her sleep. BIBA as adult cardiac arrest notification after being found by spouse making gurgling noises in her sleep.  Per EMS they worked on pt for 45 minutes and pt was given 10 shocks in the field and arrived in ED in vtach with a pulse.  (see ambulance record).

## 2023-01-20 NOTE — ED ADULT NURSE NOTE - TEMPLATE
Per Giuliana  Refill    gabapentin (NEURONTIN) 300 MG capsule 540 capsule 1 8/27/2020     Sig: TAKE 3 CAPSULES BY MOUTH THREE TIMES         pyridostigmine (MESTINON TIMESPAN) 180 MG CR tablet   General

## 2023-01-20 NOTE — CONSULT NOTE ADULT - SUBJECTIVE AND OBJECTIVE BOX
CT MEDINA  67y, Female  Allergy: No Known Allergies      CHIEF COMPLAINT:     LOS      HPI:  "7-year-old female no known medical history brought in by EMS as postcardiac arrest.  Patient found down at home by , last seen at home last night by family members this morning family found patient unresponsive.  Upon EMS arrival ACLS performed, patient received 10 shock, 13 epi.  ROSC achieved, patient intubated in the field.  As per ED - poke with patient's  Obi, and 2 adult children in person, all confirmed that the patient has no known medical problems, but has not seen a doctor in over 40 years and does not take any medications.  Per the family, patient was in her usual state of health, cooked her  dinner and did not have any complaints.  This morning, the  heard a gurgling sound, and thought she had a bad dream.  Upon further examination, he realized she was unresponsive, and not breathing, prompting him to call 911.    Bught in by EMS status post cardiac arrest, found gurgling by , received defibrillation x10 with a total of 450 mg of amiodarone given and 3 epinephrines, on arrival in a wide-complex rhythm, given 3 g of magnesium, calcium, insulin, bicarbonate, with narrowing of complexes, was overbreathing vent and biting tube requiring sedation, had blood from ET tube approximately 200 cc of which was suctioned."    Patient admitted to CCU. Seen in unit, children at bedside. As per ED, confirmed pt's history.  (20 Jan 2023 14:02)      INFECTIOUS DISEASE HISTORY:  History as above.  ID consulted for multifocal pneumonia on CTA Chest     PAST MEDICAL & SURGICAL HISTORY:  No pertinent past medical history      No significant past surgical history          FAMILY HISTORY      SOCIAL HISTORY  Social History:        ROS  unable to obtain history secondary to patient's mental status and/or sedation    VITALS:  T(F): 99, Max: 99 (01-20-23 @ 15:00)  HR: 58  BP: 106/58  RR: 16Vital Signs Last 24 Hrs  T(C): 37.2 (20 Jan 2023 15:00), Max: 37.2 (20 Jan 2023 15:00)  T(F): 99 (20 Jan 2023 15:00), Max: 99 (20 Jan 2023 15:00)  HR: 58 (20 Jan 2023 15:06) (50 - 114)  BP: 106/58 (20 Jan 2023 15:06) (102/56 - 174/103)  BP(mean): 77 (20 Jan 2023 15:06) (74 - 97)  RR: 16 (20 Jan 2023 15:06) (0 - 21)  SpO2: 94% (20 Jan 2023 15:06) (94% - 100%)    Parameters below as of 20 Jan 2023 15:06  Patient On (Oxygen Delivery Method): ventilator    O2 Concentration (%): 100    PHYSICAL EXAM:  Gen: intubated  HEENT: Normocephalic, atraumatic  Neck: supple, no lymphadenopathy  CV: Regular rate & regular rhythm  Lungs: decreased BS at bases, no fremitus  Abdomen: Soft, BS present  Ext: Warm, well perfused  Neuro: non focal, sedated  Skin: no rash, no erythema  Lines: no phlebitis    TESTS & MEASUREMENTS:                        16.0   33.66 )-----------( 84       ( 20 Jan 2023 07:16 )             49.8     01-20    143  |  103  |  15  ----------------------------<  343<H>  3.8   |  20  |  1.3    Ca    9.0      20 Jan 2023 07:16  Mg     2.4     01-20    TPro  5.9<L>  /  Alb  3.7  /  TBili  0.4  /  DBili  x   /  AST  127<H>  /  ALT  90<H>  /  AlkPhos  85  01-20      LIVER FUNCTIONS - ( 20 Jan 2023 07:16 )  Alb: 3.7 g/dL / Pro: 5.9 g/dL / ALK PHOS: 85 U/L / ALT: 90 U/L / AST: 127 U/L / GGT: x                 Blood Gas Venous - Lactate: 7.90 mmol/L (01-20-23 @ 07:03)      INFECTIOUS DISEASES TESTING  COVID-19 PCR: NotDetec (01-20-23 @ 08:00)      RADIOLOGY & ADDITIONAL TESTS:  I have personally reviewed the last Chest xray  CXR      CT  CT Angio Chest PE Protocol w/ IV Cont:   ACC: [id number] EXAM:  CT ABDOMEN AND PELVIS IC   ORDERED BY: LISSETTE HAZEL     ACC: [id number] EXAM:  CT ANGIO CHEST PULM ART WAWIC   ORDERED BY:   LISSETTE HAZEL     PROCEDURE DATE:  01/20/2023          INTERPRETATION:  CLINICAL STATEMENT: Hemoptysis    TECHNIQUE: Multislice helical sections were obtained from the thoracic   inlet to the lung bases during rapid administration of 100cc Optiray 320   intravenous contrast using a CTA protocol. Subsequently, axial CT   sections were obtained from the domes of the diaphragms to the pubic   symphysis. Thin sections were reconstructed through the pulmonary   vasculature. MIP reformats were added.    COMPARISON CT: None.    OTHER STUDIES USED FOR CORRELATION: None.      FINDINGS:    CHEST:    TUBES/LINES: Endotracheal tube projects into the right mainstem bronchus.   Recommend a 3 cm retraction. Enteric tube terminates in the stomach.   Right femoral vein catheter is in place. The tip appears to extend past   the lumen of the right common iliac vein (306/84). Clinical correlation   is advised.    PULMONARY EMBOLUS: No pulmonary embolus.    LUNGS/PLEURA/PERICARDIUM:: Trace right pleural effusion. Dense multifocal   airspace consolidation seen bilaterally centered at the lung bases and   apices suspicious for multifocal pneumonia. No pneumothorax.    CHEST WALL: Multiple right chest wall/breast intermediate density soft   tissue nodules measuring up to 3.6 x 3.5 cm. Adjacent fat stranding seen   in the right breast tissue. Subjacent mildly displaced rib fractures   involving the right anterior third fourth fifth sixth and seventh ribs.   Mildly displaced right anterior fourth, fifth, sixth, seventh rib   fractures.    MEDIASTINUM/THORACIC NODES: Unremarkable.      ABDOMEN/PELVIS:    HEPATOBILIARY: Bilobar hepatic cysts.    SPLEEN: Unremarkable.    PANCREAS: Unremarkable.    ADRENAL GLANDS: Unremarkable.    KIDNEYS: Unremarkable.    ABDOMINOPELVIC NODES: Unremarkable.    PELVIC ORGANS: Ray catheter is in place. Likely uterine fibroid with   mass effect on the endometrial stripe (306/103).    PERITONEUM/MESENTERY/BOWEL: Unremarkable.    BONES/SOFT TISSUES: Multilevel degenerative changes. Bilateral rib   fractures as detailed above.      IMPRESSION:    No evidence of a pulmonary embolism.    Endotracheal tube projects into the right mainstem bronchus. Recommend a   3 cm retraction.    Right femoral vein catheter is in place. The tip appears to extend past   the lumen of the right common iliac vein (306/84). Clinical correlation   is advised.    Dense multifocal airspace consolidation seen bilaterally centered at the   lung bases and apices suspicious for multifocal pneumonia. Trace right   pleural effusion.    Multiple right chest wall/breast intermediate density soft tissue nodules   measuring up to 3.6 x 3.5 cm. Adjacent fat stranding seen in the right   breast tissue. Differential includes posttraumatic changes with hematoma   formation versus underlying right breast malignancy.    Subjacent mildly displaced rib fractures involving the right anterior   third fourth fifth sixth and seventh ribs. Mildly displaced right   anterior fourth, fifth, sixth, seventh rib fractures.    --- End of Report ---            PEGGY PRAKASH MD; Attending Radiologist  This document has been electronically signed. Jan 20 2023 10:12AM (01-20-23 @ 09:24)      CARDIOLOGY TESTING  12 Lead ECG:   Ventricular Rate 57 BPM    Atrial Rate 57 BPM    P-R Interval 118 ms    QRS Duration 164 ms    Q-T Interval 604 ms    QTC Calculation(Bazett) 587 ms    P Axis 250 degrees    R Axis 2 degrees    T Axis 33 degrees    Diagnosis Line Unusual P axis andshort GA, probable junctional rhythm with Premature  ventricular complexes or Fusion complexes  Right bundle branch block  Abnormal ECG    Confirmed by FRANCOISE MADSEN MD ([id number]) on 1/20/2023 3:47:08 PM (01-20-23 @ 11:24)  12 Lead ECG:   Ventricular Rate 57 BPM    Atrial Rate 68 BPM    P-R Interval 232 ms    QRS Duration 178 ms    Q-T Interval 510 ms    QTC Calculation(Bazett) 496 ms    P Axis 43 degrees    R Axis 35 degrees    T Axis 35 degrees    Diagnosis Line Sinus rhythm with 1st degree A-V block with Blocked Premature atrial complexes   with occasional Premature ventricular complexes  Right bundle branch block  Abnormal ECG    Confirmed by FRANCOISE MADSEN MD ([id number]) on 1/20/2023 10:48:40 AM (01-20-23 @ 09:19)      MEDICATIONS  buMETAnide Injectable 1 IV Push once  chlorhexidine 4% Liquid 1 Topical <User Schedule>  dexMEDEtomidine Infusion 0.2 IV Continuous <Continuous>  enoxaparin Injectable 40 SubCutaneous every 24 hours  fentaNYL   Infusion. 0.5 IV Continuous <Continuous>  midazolam Infusion 0.02 IV Continuous <Continuous>  norepinephrine Infusion 0.05 IV Continuous <Continuous>  propofol Infusion 5 IV Continuous <Continuous>      Weight  Weight (kg): 110 (01-20-23 @ 11:52)    ANTIBIOTICS:      ALLERGIES:  No Known Allergies

## 2023-01-20 NOTE — CONSULT NOTE ADULT - ASSESSMENT
IMPRESSION:  Acute hypoxemic respiratory failure  S/p CPA x45min, 13 rounds of epi, 10 shocks  Multifocal pneumonia  Acute respiratory distress syndrome  Sepsis present on admission    PLAN:    CNS: Adequate sedation for 24hrs. Currently on fentanyl, propofol, and versed. Check neuron specific enolase. SAT tomorrow. EEG tomorrow when off sedation if nonresponsive.     HEENT: Oral care    PULMONARY:  HOB @ 45 degrees. Vent changes as follows: ARDS network protocol. 6cc/kg ideal body weight tidal volume. Target SpO2 92-96%, titrate oxygen as tolerated. Target Peak<45, target plateau<30, target DP<15. Increase PEEP to 8 if pressures allow.  Inc RR to 24. Repeat ABG in PM.     CARDIOVASCULAR: ECHO. Trend CE. Avoid volume overload. BNP. Bumex 1mg IV x1 dose.     GI: GI prophylaxis.     RENAL:  Follow up lytes.  Correct as needed. Ray.     INFECTIOUS DISEASE: PanCx. Procal. Fungitell. Nasal MRSA. DTA. UA. Serum galactomannan. Urine strep/legionella. 2 sets of BCx. Full RVP. Vancomycin, cefepime, and levofloxacin for now. ID eval.     HEMATOLOGICAL:  DVT prophylaxis. Dimer, LE venous duplex.     ENDOCRINE:  Follow up FS. Target -180. TSH.     MUSCULOSKELETAL: bedrest    Place A-line    MICU    Overall poor prognosis

## 2023-01-20 NOTE — H&P ADULT - ATTENDING COMMENTS
67 year old F with no past medical history (does not see doctors) was found by  this am unresponsive - called EMS who found the pt pulseless and coded pt ~45min s/p 10 shocks then ROSC.  at bedside reports pt was feeling  fine yesterday, not sick, no chest pains or SOB.    < from: CT Abdomen and Pelvis w/ IV Cont (01.20.23 @ 09:24) >    IMPRESSION:    No evidence of a pulmonary embolism.    Endotracheal tube projects into the right mainstem bronchus. Recommend a   3 cm retraction.    Right femoral vein catheter is in place. The tip appears to extend past   the lumen of the right common iliac vein (306/84). Clinical correlation   is advised.    Dense multifocal airspace consolidation seen bilaterally centered at the   lung bases and apices suspicious for multifocal pneumonia. Trace right   pleural effusion.    Multiple right chest wall/breast intermediate density soft tissue nodules   measuring up to 3.6 x 3.5 cm. Adjacent fat stranding seen in the right   breast tissue. Differential includes posttraumatic changes with hematoma   formation versus underlying right breast malignancy.    Subjacent mildly displaced rib fractures involving the right anterior   third fourth fifth sixth and seventh ribs. Mildly displaced right   anterior fourth, fifth, sixth, seventh rib fractures.      < end of copied text >    A/P    prolonged Cardiac arrest / aspiration pneumonia - with possible sepsis POA  / multiple rib fractures from CPR / suspected anoxic brain injury     - targeted temperature management protocol   - pressors to maintain MAP >65   - broad spectrum antibiotics   - follow cultures and procal   - EEG and neuro consult   - check 2DECHO and repeat cardiac enzymes and EKG - cardio consult   - I spoke with family at bedside about plan and  very poor prognosis - all questions answered

## 2023-01-20 NOTE — CONSULT NOTE ADULT - ASSESSMENT
ASSESSMENT  67-year-old female no known medical history brought in by EMS as postcardiac arrest.    IMPRESSION  #Cardiac Arrest with prolonged CPR   #Hypoxic Respiratory Failure  #Multifocal Pneumonia/Aspiration Pneumonia   - CT Angio Chest PE Protocol w/ IV Cont (01.20.23 @ 09:24): Dense multifocal airspace consolidation seen bilaterally centered at the   lung bases and apices suspicious for multifocal pneumonia. Trace right  pleural effusion.      #Leukocytosis with Lymphocytosis   #Transaminitis  #Obesity BMI (kg/m2): 44.3  #Abx allergy: NKDA      RECOMMENDATIONS  - can hold on further vancomycin dosing   - check MRSA nares  - Ceftriaxone 2g daily and flagyl 500 mg TID in case of large volume aspiration   - check deep tracheal cx   - follow-up blood Cx  - trend WBC   - poor prognosis     Please call or message on [employer] Teams if with any questions.  Spectra [phone]

## 2023-01-20 NOTE — ED ADULT NURSE NOTE - CHIEF COMPLAINT QUOTE
BIBA as adult cardiac arrest notification after being found by spouse making gurgling noises in her sleep.  Per EMS they worked on pt for 45 minutes and pt was given 10 shocks in the field and arrived in ED in vtach with a pulse.  (see ambulance record).

## 2023-01-20 NOTE — ED PROVIDER NOTE - OBJECTIVE STATEMENT
67-year-old female no known medical history brought in by EMS as postcardiac arrest.  Patient found down at home by , last seen at home last night by family members this morning family found patient unresponsive.  Upon EMS arrival ACLS performed, patient received 10 shock, 13 epi.  ROSC achieved, patient intubated in the field. 67-year-old female no known medical history brought in by EMS as postcardiac arrest.  Patient found down at home by , last seen at home last night by family members this morning family found patient unresponsive.  Upon EMS arrival ACLS performed, patient received 10 shock, 13 epi.  ROSC achieved, patient intubated in the field.    ADDENDUM ([id number]):   Spoke with patient's  Obi, and 2 adult children in person, all confirmed that the patient has no known medical problems, but has not seen a doctor in over 40 years and does not take any medications.  Per the family, patient was in her usual state of health, cooked her  dinner and did not have any complaints.  This morning, the  heard a gurgling sound, and thought she had a bad dream.  Upon further examination, he realized she was unresponsive, and not breathing, prompting him to call 911. 67-year-old female no known medical history brought in by EMS as postcardiac arrest.  Patient found down at home by , last seen at home last night by family members this morning family found patient unresponsive.  Upon EMS arrival ACLS performed, patient received 10 shock, 3 epi.  ROSC achieved, patient intubated in the field.    ADDENDUM ([id number]):   Spoke with patient's  Obi, and 2 adult children in person, all confirmed that the patient has no known medical problems, but has not seen a doctor in over 40 years and does not take any medications.  Per the family, patient was in her usual state of health, cooked her  dinner and did not have any complaints.  This morning, the  heard a gurgling sound, and thought she had a bad dream.  Upon further examination, he realized she was unresponsive, and not breathing, prompting him to call 911.

## 2023-01-20 NOTE — CONSULT NOTE ADULT - SUBJECTIVE AND OBJECTIVE BOX
Patient is a 67y old  Female who presents with a chief complaint of     HPI:  67-year-old female no known medical history brought in by EMS as postcardiac arrest.  Patient found down at home by , last seen at home last night by family members this morning family found patient unresponsive.  Upon EMS arrival ACLS performed, patient received 10 shock, 13 epi.  ROSC achieved, patient intubated in the field.    PAST MEDICAL & SURGICAL HISTORY:      SOCIAL HX:   Smoking        never smoker                 ETOH       denies                     Other denies illicit drug use, from home, ambulates independently at baseline, AOx4, denies travel any travel in past 6 months    FAMILY HISTORY:  :  No known cardiovascular family history     Review Of Systems:     All ROS are negative except per HPI       Allergies    No Known Allergies    Intolerances        PHYSICAL EXAM    ICU Vital Signs Last 24 Hrs  T(C): 37.1 (20 Jan 2023 09:33), Max: 37.1 (20 Jan 2023 09:33)  T(F): 98.8 (20 Jan 2023 09:33), Max: 98.8 (20 Jan 2023 09:33)  HR: 62 (20 Jan 2023 09:33) (62 - 114)  BP: 152/74 (20 Jan 2023 09:33) (109/74 - 174/103)  RR: 12 (20 Jan 2023 06:55) (12 - 12)  SpO2: 96% (20 Jan 2023 09:33) (96% - 100%)    O2 Parameters below as of 20 Jan 2023 09:33  Patient On (Oxygen Delivery Method): ventilator        CONSTITUTIONAL:  Ill-appearing female. NAD    ENT:   Airway patent,   Mouth with normal mucosa.   No thrush    EYES:   pupils equal,   round and reactive to light.    CARDIAC:   Normal rate,   Regular rhythm.    Heart sounds S1, S2.   No edema    RESPIRATORY:   Dec B/L BS  No wheezing   Normal chest expansion  No use of accessory muscles    GASTROINTESTINAL:  Abdomen soft   Non-tender,   No guarding,   + BS    MUSCULOSKELETAL:   Range of motion is not limited,  No clubbing, cyanosis    NEUROLOGICAL:   Sedated    SKIN:   Skin normal color for race,   Warm and dry  No evidence of rash.    PSYCHIATRIC:      No apparent risk to self or others.        LABS:                          16.0   33.66 )-----------( 84       ( 20 Jan 2023 07:16 )             49.8                                               01-20    143  |  103  |  15  ----------------------------<  343<H>  3.8   |  20  |  1.3    Ca    9.0      20 Jan 2023 07:16  Mg     2.4     01-20    TPro  5.9<L>  /  Alb  3.7  /  TBili  0.4  /  DBili  x   /  AST  127<H>  /  ALT  90<H>  /  AlkPhos  85  01-20      PT/INR - ( 20 Jan 2023 07:16 )   PT: 11.40 sec;   INR: 1.00 ratio         PTT - ( 20 Jan 2023 07:16 )  PTT:40.7 sec                                           CARDIAC MARKERS ( 20 Jan 2023 07:16 )  x     / 0.61 ng/mL / x     / x     / x                                                LIVER FUNCTIONS - ( 20 Jan 2023 07:16 )  Alb: 3.7 g/dL / Pro: 5.9 g/dL / ALK PHOS: 85 U/L / ALT: 90 U/L / AST: 127 U/L / GGT: x                                                                               Mode: AC/ CMV (Assist Control/ Continuous Mandatory Ventilation)  RR (machine): 18  TV (machine): 400  FiO2: 100  PEEP: 6  MAP: 11  PIP: 20                                      ABG - ( 20 Jan 2023 07:18 )  pH, Arterial: 7.15  pH, Blood: x     /  pCO2: 63    /  pO2: 66    / HCO3: 22    / Base Excess: -8.1  /  SaO2: 88.5          X-Rays reviewed:                                                                                    ECHO    CXR interpreted by me: bilateral interstitial infiltrates    MEDICATIONS  (STANDING):  fentaNYL   Infusion. 0.5 MICROgram(s)/kG/Hr (3.97 mL/Hr) IV Continuous <Continuous>  midazolam Infusion 0.02 mG/kG/Hr (1.59 mL/Hr) IV Continuous <Continuous>  propofol Infusion 5 MICROgram(s)/kG/Min (2.38 mL/Hr) IV Continuous <Continuous>  vancomycin  IVPB. 1250 milliGRAM(s) IV Intermittent once    MEDICATIONS  (PRN):   Patient is a 67y old  Female who presents with a chief complaint of     HPI:  67-year-old female no known medical history brought in by EMS as postcardiac arrest.  Patient found down at home by , last seen at home last night by family members this morning family found patient unresponsive.  Upon EMS arrival ACLS performed, patient received 10 shock, 13 epi.  ROSC achieved, patient intubated in the field.    PAST MEDICAL & SURGICAL HISTORY:      SOCIAL HX:   Smoking        never smoker                 ETOH       denies                     Other denies illicit drug use, from home, ambulates independently at baseline, AOx4, denies travel any travel in past 6 months    FAMILY HISTORY:  :  No known cardiovascular family history     Review Of Systems:     All ROS are negative except per HPI       Allergies    No Known Allergies    Intolerances        PHYSICAL EXAM    ICU Vital Signs Last 24 Hrs  T(C): 37.1 (20 Jan 2023 09:33), Max: 37.1 (20 Jan 2023 09:33)  T(F): 98.8 (20 Jan 2023 09:33), Max: 98.8 (20 Jan 2023 09:33)  HR: 62 (20 Jan 2023 09:33) (62 - 114)  BP: 152/74 (20 Jan 2023 09:33) (109/74 - 174/103)  RR: 12 (20 Jan 2023 06:55) (12 - 12)  SpO2: 96% (20 Jan 2023 09:33) (96% - 100%)    O2 Parameters below as of 20 Jan 2023 09:33  Patient On (Oxygen Delivery Method): ventilator        CONSTITUTIONAL:  Ill-appearing female. NAD    ENT:   Airway patent,   Mouth with normal mucosa.   No thrush    EYES:   pupils equal,   round and reactive to light.    CARDIAC:   Normal rate,   Regular rhythm.    Heart sounds S1, S2.   No edema    RESPIRATORY:   Dec B/L BS  ronchi b/l  No wheezing   Normal chest expansion  No use of accessory muscles    GASTROINTESTINAL:  Abdomen soft   Non-tender,   No guarding,   + BS    MUSCULOSKELETAL:   Range of motion is not limited,  No clubbing, cyanosis    NEUROLOGICAL:   Sedated    SKIN:   Skin normal color for race,   Warm and dry  No evidence of rash.    PSYCHIATRIC:      No apparent risk to self or others.        LABS:                          16.0   33.66 )-----------( 84       ( 20 Jan 2023 07:16 )             49.8                                               01-20    143  |  103  |  15  ----------------------------<  343<H>  3.8   |  20  |  1.3    Ca    9.0      20 Jan 2023 07:16  Mg     2.4     01-20    TPro  5.9<L>  /  Alb  3.7  /  TBili  0.4  /  DBili  x   /  AST  127<H>  /  ALT  90<H>  /  AlkPhos  85  01-20      PT/INR - ( 20 Jan 2023 07:16 )   PT: 11.40 sec;   INR: 1.00 ratio         PTT - ( 20 Jan 2023 07:16 )  PTT:40.7 sec                                           CARDIAC MARKERS ( 20 Jan 2023 07:16 )  x     / 0.61 ng/mL / x     / x     / x                                                LIVER FUNCTIONS - ( 20 Jan 2023 07:16 )  Alb: 3.7 g/dL / Pro: 5.9 g/dL / ALK PHOS: 85 U/L / ALT: 90 U/L / AST: 127 U/L / GGT: x                                                                               Mode: AC/ CMV (Assist Control/ Continuous Mandatory Ventilation)  RR (machine): 18  TV (machine): 400  FiO2: 100  PEEP: 6  MAP: 11  PIP: 20                                      ABG - ( 20 Jan 2023 07:18 )  pH, Arterial: 7.15  pH, Blood: x     /  pCO2: 63    /  pO2: 66    / HCO3: 22    / Base Excess: -8.1  /  SaO2: 88.5          X-Rays reviewed:                                                                                    ECHO    CXR interpreted by me: bilateral interstitial infiltrates    MEDICATIONS  (STANDING):  fentaNYL   Infusion. 0.5 MICROgram(s)/kG/Hr (3.97 mL/Hr) IV Continuous <Continuous>  midazolam Infusion 0.02 mG/kG/Hr (1.59 mL/Hr) IV Continuous <Continuous>  propofol Infusion 5 MICROgram(s)/kG/Min (2.38 mL/Hr) IV Continuous <Continuous>  vancomycin  IVPB. 1250 milliGRAM(s) IV Intermittent once    MEDICATIONS  (PRN):

## 2023-01-20 NOTE — CONSULT NOTE ADULT - SUBJECTIVE AND OBJECTIVE BOX
HPI:  "7-year-old female no known medical history brought in by EMS as postcardiac arrest.  Patient found down at home by , last seen at home last night by family members this morning family found patient unresponsive.  Upon EMS arrival ACLS performed, patient received 10 shock, 13 epi.  ROSC achieved, patient intubated in the field.  As per ED - poke with patient's  Obi, and 2 adult children in person, all confirmed that the patient has no known medical problems, but has not seen a doctor in over 40 years and does not take any medications.  Per the family, patient was in her usual state of health, cooked her  dinner and did not have any complaints.  This morning, the  heard a gurgling sound, and thought she had a bad dream.  Upon further examination, he realized she was unresponsive, and not breathing, prompting him to call 911.    Bught in by EMS status post cardiac arrest, found gurgling by , received defibrillation x10 with a total of 450 mg of amiodarone given and 3 epinephrines, on arrival in a wide-complex rhythm, given 3 g of magnesium, calcium, insulin, bicarbonate, with narrowing of complexes, was overbreathing vent and biting tube requiring sedation, had blood from ET tube approximately 200 cc of which was suctioned."    Patient admitted to CCU. Seen in unit, children at bedside. As per ED, confirmed pt's history.     PAST MEDICAL & SURGICAL HISTORY  No pertinent past medical history    ALLERGIES:  No Known Allergies      MEDICATIONS:  MEDICATIONS  (STANDING):  buMETAnide Injectable 1 milliGRAM(s) IV Push once  chlorhexidine 4% Liquid 1 Application(s) Topical <User Schedule>  fentaNYL   Infusion. 0.5 MICROgram(s)/kG/Hr (3.97 mL/Hr) IV Continuous <Continuous>  midazolam Infusion 0.02 mG/kG/Hr (1.59 mL/Hr) IV Continuous <Continuous>  norepinephrine Infusion 0.05 MICROgram(s)/kG/Min (10.3 mL/Hr) IV Continuous <Continuous>  propofol Infusion 5 MICROgram(s)/kG/Min (2.38 mL/Hr) IV Continuous <Continuous>  vancomycin  IVPB. 1250 milliGRAM(s) IV Intermittent once    MEDICATIONS  (PRN):      HOME MEDICATIONS:  Home Medications:        OBJECTIVE:  ICU Vital Signs Last 24 Hrs  T(C): 36.9 (20 Jan 2023 11:52), Max: 37.1 (20 Jan 2023 09:33)  T(F): 98.4 (20 Jan 2023 11:52), Max: 98.8 (20 Jan 2023 09:33)  HR: 62 (20 Jan 2023 11:52) (62 - 114)  BP: 120/63 (20 Jan 2023 11:52) (109/74 - 174/103)  BP(mean): --  ABP: --  ABP(mean): --  RR: 19 (20 Jan 2023 11:52) (12 - 19)  SpO2: 99% (20 Jan 2023 11:52) (96% - 100%)    O2 Parameters below as of 20 Jan 2023 11:52  Patient On (Oxygen Delivery Method): ventilator      Mode: AC/ CMV (Assist Control/ Continuous Mandatory Ventilation)  RR (machine): 18  TV (machine): 400  FiO2: 100  PEEP: 6  MAP: 11  PIP: 20      20 Jan 2023 07:01  -  20 Jan 2023 13:01  --------------------------------------------------------  IN: 0 mL / OUT: 275 mL / NET: -275 mL      Daily Height in cm: 157.48 (20 Jan 2023 11:52)    Daily     PHYSICAL EXAM:  NEURO: intubated, sedated  Gen: no acute distress  NECK: no thyroid enlargement, no JVD  LUNGS: Clear to auscultation bilaterally   CARDIOVASCULAR: S1/S2 present, RRR , no murmurs or rubs, no carotid bruits,  + PP bilaterally  ABD: Soft, non-tender, non-distended, +BS  EXT: No CHEIKH  SKIN: Intact    LABS:                        16.0   33.66 )-----------( 84       ( 20 Jan 2023 07:16 )             49.8     01-20    143  |  103  |  15  ----------------------------<  343<H>  3.8   |  20  |  1.3    Ca    9.0      20 Jan 2023 07:16  Mg     2.4     01-20    TPro  5.9<L>  /  Alb  3.7  /  TBili  0.4  /  DBili  x   /  AST  127<H>  /  ALT  90<H>  /  AlkPhos  85  01-20    PT/INR - ( 20 Jan 2023 07:16 )   PT: 11.40 sec;   INR: 1.00 ratio         PTT - ( 20 Jan 2023 07:16 )  PTT:40.7 sec  Troponin T, Serum: 0.61 ng/mL *HH* (01-20-23 @ 07:16)    CARDIAC MARKERS ( 20 Jan 2023 07:16 )  x     / 0.61 ng/mL / x     / x     / x

## 2023-01-20 NOTE — ED PROVIDER NOTE - PROGRESS NOTE DETAILS
JR: Patient endorsed to Dr. Gandhi- plan f/u labs, imaging and dispo ICU Patient on /FiO 100/PEEP 6; on Versed and Fentanyl, with 1L IVF going. Saturation improved to low 90s% (poor waveform) s/p Vec 10 IVP and repositioning (with improvement in peak pressures from high 40s to high 10s). Patient in CT. Family updated.

## 2023-01-20 NOTE — ED PROVIDER NOTE - ATTENDING APP SHARED VISIT CONTRIBUTION OF CARE
67F who has not seen a doctor in >20yrs BIBEMS for nonresponsiveness. Per her  Obi she went to bed in normal state of health. This morning she was gurgling, non-responsive so he called 911. EMS report VT/VF, s/p synchronized cardioversion x10, Amiodarone 300mg and 150mg x1, epi x3. Pt intubated in route. Total down time PTA 45mins. On arrival rhythm wide complex rhythm with prolonged Qt.    VS on arrival: /103, HR 120s, O2 91% on vent    Gen - biting the ETT, minimally responsive, Head - NCAT, Eyes- pupils 3mm PERRLA, Pharynx - ETT confirmed with glidescope, + gross blood in tube, Heart - regularly irregular rhythm, Lungs - coarse rhonchi b/l bases, Abdomen - soft, NT, ND, Skin - No rash, Extremities - no edema, erythema, ecchymosis, brisk cap refill, Neuro - minimally responsive, moving all 3 extremities, withdraws from pain    a/p:  VF/VT arrest  s/p bicarb x1, calcium x1, insulin 10U, magnesium 3g  MV, sedation  cont monitoring on tele  imaging, dispo ICU

## 2023-01-20 NOTE — H&P ADULT - HISTORY OF PRESENT ILLNESS
"7-year-old female no known medical history brought in by EMS as postcardiac arrest.  Patient found down at home by , last seen at home last night by family members this morning family found patient unresponsive.  Upon EMS arrival ACLS performed, patient received 10 shock, 13 epi.  ROSC achieved, patient intubated in the field.  As per ED - poke with patient's  Obi, and 2 adult children in person, all confirmed that the patient has no known medical problems, but has not seen a doctor in over 40 years and does not take any medications.  Per the family, patient was in her usual state of health, cooked her  dinner and did not have any complaints.  This morning, the  heard a gurgling sound, and thought she had a bad dream.  Upon further examination, he realized she was unresponsive, and not breathing, prompting him to call 911.    Bught in by EMS status post cardiac arrest, found gurgling by , received defibrillation x10 with a total of 450 mg of amiodarone given and 3 epinephrines, on arrival in a wide-complex rhythm, given 3 g of magnesium, calcium, insulin, bicarbonate, with narrowing of complexes, was overbreathing vent and biting tube requiring sedation, had blood from ET tube approximately 200 cc of which was suctioned."    Patient admitted to CCU. Seen in unit, children at bedside. As per ED, confirmed pt's history.

## 2023-01-20 NOTE — PHARMACOTHERAPY INTERVENTION NOTE - COMMENTS
Unable to reach provider prior to administration, believe intention to order 1GRAM of calcium carbonate for cardiac arrest, however order was 1MG of calcium carbonate.

## 2023-01-20 NOTE — H&P ADULT - NSHPPHYSICALEXAM_GEN_ALL_CORE
NEURO: intubated, sedated  Gen: no acute distress  NECK: no thyroid enlargement, no JVD  LUNGS: coarse bs bl  CARDIOVASCULAR: S1/S2 present, RRR , no murmurs or rubs, no carotid bruits,  + PP bilaterally  ABD: Soft, non-tender, non-distended, +BS  EXT: No CHEIKH  SKIN: Intact

## 2023-01-20 NOTE — ED ADULT NURSE NOTE - NSSEPSISSUSPECTED_ED_A_ED
Patient administered albuterol via MDI with personal spacer attachment. Tolerated without complication. Spacer to be dispensed for home use, patient verbalizes and demonstrates understanding of use.    NDC: 9475-3407-05  LOT: [id number]  EXP: 12/31/2021  Dose: 4 puffs     No

## 2023-01-21 NOTE — PROGRESS NOTE ADULT - SUBJECTIVE AND OBJECTIVE BOX
Patient is a 67y old  Female who presents with a chief complaint of Cardiac arrest (2023 17:11)      Over Night Events:  Patient seen and examined.   s/p cardiac arrest and pace maker placement by intensivist over night     ROS:  See HPI    PHYSICAL EXAM    ICU Vital Signs Last 24 Hrs  T(C): 39.1 (2023 05:01), Max: 39.5 (2023 03:50)  T(F): 102.4 (2023 05:01), Max: 103.1 (2023 03:50)  HR: 79 (2023 05:01) (28 - 122)  BP: 120/59 (2023 00:00) (94/53 - 174/103)  BP(mean): 81 (2023 00:00) (68 - 97)  ABP: 145/65 (2023 05:01) (49/49 - 293/162)  ABP(mean): 92 (2023 05:01) (49 - 204)  RR: 63 (2023 05:01) (0 - 63)  SpO2: 99% (2023 05:01) (76% - 100%)    O2 Parameters below as of 2023 05:01  Patient On (Oxygen Delivery Method): ventilator            General: ill looking   HEENT:    et tube             Lymph Nodes: NO cervical LN   Lungs: Bilateral BS  Cardiovascular: Regular   Abdomen: Soft, Positive BS  Extremities: No clubbing   Skin: warm   Neurological: mild gag   Musculoskeletal: move all ext     I&O's Detail    2023 07:01  -  2023 06:35  --------------------------------------------------------  IN:    FentaNYL: 286 mL    FentaNYL: 85 mL    Midazolam: 8 mL    Midazolam: 52 mL    Norepinephrine: 1120 mL  Total IN: 1551 mL    OUT:    Indwelling Catheter - Urethral (mL): 950 mL    Propofol: 0 mL  Total OUT: 950 mL    Total NET: 601 mL          LABS:                          13.9   27.86 )-----------( 114      ( 2023 21:31 )             42.0         2023 21:26    143    |  109    |  25     ----------------------------<  122    4.5     |  22     |  1.6      Ca    8.6        2023 21:26  Mg     2.4       2023 07:16    TPro  5.9    /  Alb  3.7    /  TBili  0.4    /  DBili  x      /  AST  127    /  ALT  90     /  AlkPhos  85     2023 07:16  Amylase x     lipase x                                                 PT/INR - ( 2023 07:16 )   PT: 11.40 sec;   INR: 1.00 ratio         PTT - ( 2023 07:16 )  PTT:40.7 sec                                       Urinalysis Basic - ( 2023 16:45 )    Color: Yellow / Appearance: Slightly Cloudy / S.015 / pH: x  Gluc: x / Ketone: Negative  / Bili: Negative / Urobili: 0.2 mg/dL   Blood: x / Protein: 100 mg/dL / Nitrite: Negative   Leuk Esterase: Negative / RBC: 26-50 /HPF / WBC 3-5 /HPF   Sq Epi: x / Non Sq Epi: Few /HPF / Bacteria: Few        Lactate, Blood: 2.7 mmol/L (23 @ 21:26)    CARDIAC MARKERS ( 2023 22:00 )  x     / 10.89 ng/mL / x     / x     / x      CARDIAC MARKERS ( 2023 15:19 )  x     / 9.45 ng/mL / x     / x     / x      CARDIAC MARKERS ( 2023 07:16 )  x     / 0.61 ng/mL / x     / x     / x                                                                                                         Mode: AC/ CMV (Assist Control/ Continuous Mandatory Ventilation)  RR (machine): 24  TV (machine): 400  FiO2: 100  PEEP: 8  MAP: 13  PIP: 25                                      ABG - ( 2023 03:14 )  pH, Arterial: 7.25  pH, Blood: x     /  pCO2: 42    /  pO2: 82    / HCO3: 18    / Base Excess: -8.5  /  SaO2: 96.1                MEDICATIONS  (STANDING):  aspirin  chewable 81 milliGRAM(s) Oral daily  atorvastatin 40 milliGRAM(s) Oral at bedtime  cefTRIAXone   IVPB      cefTRIAXone   IVPB 2000 milliGRAM(s) IV Intermittent every 24 hours  chlorhexidine 4% Liquid 1 Application(s) Topical <User Schedule>  clopidogrel Tablet 300 milliGRAM(s) Oral once  dexMEDEtomidine Infusion 0.2 MICROgram(s)/kG/Hr (5.5 mL/Hr) IV Continuous <Continuous>  EPINEPHrine    Infusion 0.03 MICROgram(s)/kG/Min (6.19 mL/Hr) IV Continuous <Continuous>  fentaNYL   Infusion. 0.5 MICROgram(s)/kG/Hr (5.5 mL/Hr) IV Continuous <Continuous>  heparin   Injectable 5000 Unit(s) IV Push once  heparin  Infusion.  Unit(s)/Hr (10 mL/Hr) IV Continuous <Continuous>  metroNIDAZOLE  IVPB 500 milliGRAM(s) IV Intermittent every 8 hours  midazolam Infusion 0.02 mG/kG/Hr (2.2 mL/Hr) IV Continuous <Continuous>  norepinephrine Infusion 0.05 MICROgram(s)/kG/Min (10.3 mL/Hr) IV Continuous <Continuous>  propofol Infusion 5 MICROgram(s)/kG/Min (2.38 mL/Hr) IV Continuous <Continuous>    MEDICATIONS  (PRN):  heparin   Injectable 6000 Unit(s) IV Push every 6 hours PRN For aPTT less than 40          Xrays:  TLC:  OG:  ET tube:                                                                                    b/l opacity    ECHO:  CAM ICU:           no

## 2023-01-21 NOTE — PROGRESS NOTE ADULT - ASSESSMENT
IMPRESSION:  Acute hypoxemic respiratory failure  cardio pulmonary arrest  x45min, 13 rounds of epi, 10 shocks  prolonged down time  Multifocal pneumonia possible aspiration   Acute respiratory distress syndrome  Sepsis present on admission  likely anoxic brain damage  enterovirus   PLAN:    CNS: Adequate sedation for 24hrs. Currently on fentanyl, propofol, and versed. Check neuron specific enolase. Paralytic only if needed  EEG   neurology eval      HEENT: Oral care    PULMONARY:  HOB @ 45 degrees. adjust ETT  increase rate to 28    CARDIOVASCULAR: ECHO. Trend CE. follow cardiology   keep IS =OS   taper pressors to map 65   Avoid volume overload. BNP.  cardiac management    GI: GI prophylaxis. NPO check LA     RENAL:  Follow up lytes.  Correct as needed. Ray.     INFECTIOUS DISEASE: PanCx. Procal. Fungitell.   Nasal MRSA. DTA.   UA.   Serum galactomannan.   Urine strep/legionella. 2 sets of BCx. Full RVP.   d/c Vancomycin,  abx as per ID     HEMATOLOGICAL:  DVT prophylaxis. Dimer, LE venous duplex.     ENDOCRINE:  Follow up FS. Target -180. TSH.     MUSCULOSKELETAL: bedrest        MICU  very poor prognosis   DNR

## 2023-01-21 NOTE — CONSULT NOTE ADULT - ASSESSMENT
68 y/o f w/ no significant medical history BIBEMS for cardiac arrest s/p shocks, epi, amio 45 min down time leading to rosc admitte to CCU for 3rd deg HB requiring TVP/ AHRF/ possible multifocal pna/ + RVP/ sepsis/  intubated on pressors found to have elevated troponin.     Trop 10.5  ck/ckmb: 2498/224  ecg 01/20 sinus rtyth w/ 3rd deg avb occasional pvcs    Impression  # cardiac arrest   # Complete HB  # elevated CE r/o MI  # acute Respiratory failure    Plan  - currently tubed, on pressors, v-paced rythm hr 80s on tele  - Trend CE  - serial ecg  - obtain TTE  - currently not a candidate for cath due to critical illness  - Medically tx  - Asa, Statin, introduce BB when off pressors  - on heparin gtt, would dc if signs of bleeding  - check Lipids, A1c  - EP eval for PPM

## 2023-01-21 NOTE — CONSULT NOTE ADULT - CONSULT REQUESTED DATE/TIME
20-Jan-2023 10:43
21-Jan-2023 11:08
insulin glargine (LANTUS SOLOSTAR) 100 UNIT/ML pen  Last Written Prescription Date:  9/9/21  Last Fill Quantity: 15ml,   # refills: 0  Last Office Visit : 10/12/21  Future Office visit:  None noted     Routing refill request to provider for review/approval because:  Insulin - refilled per clinic          
20-Jan-2023 13:00
21-Jan-2023 16:10
20-Jan-2023 17:11
21-Jan-2023 11:09

## 2023-01-21 NOTE — GOALS OF CARE CONVERSATION - ADVANCED CARE PLANNING - CONVERSATION DETAILS
Called patient's  Obi Escobar ([phone]) to give medical update. Patient with worsening troponin, bradycardia, hypotension - getting TVP. Updated patient's  of patient's medical condition. Explained the possibility of deterioration and cardiac arrest. Described the process of CPR. The  understands and wishes to make the patient DNR.

## 2023-01-21 NOTE — CONSULT NOTE ADULT - NS PANP COMMENT GEN_ALL_CORE FT
I agree with NP [facility] with H/P, PE, A/P      sp cardiac arrest,  s/p TVP  unclear mental status  If the patient's neurologic status is intact, will initiate ischemic workup and PPM.

## 2023-01-21 NOTE — PROGRESS NOTE ADULT - SUBJECTIVE AND OBJECTIVE BOX
Pt seen and examined.  and children at bedside.  Pt calm    T(F): , Max: 103.1 (01-21-23 @ 03:50)  HR: 75 (01-21-23 @ 22:00) (28 - 122)  BP: 132/66 (01-21-23 @ 22:00)  RR: 127 (01-21-23 @ 22:00)  SpO2: 100% (01-21-23 @ 22:00)  IN: 1547 mL / OUT: 950 mL / NET: 597 mL    IN: 583 mL / OUT: 440 mL / NET: 143 mL      General: No apparent distress  Cardiovascular: S1, S2  Gastrointestinal: Soft, Non-tender, Non-distended  Respiratory: vented  Lymphatic: No edema  Neurologic: sedated  Dermatologic: Skin dry  PT/INR - ( 20 Jan 2023 07:16 )   PT: 11.40 sec;   INR: 1.00 ratio         PTT - ( 21 Jan 2023 14:57 )  PTT:55.0 sec                        13.9   24.68 )-----------( 124      ( 21 Jan 2023 06:24 )             42.6     01-21    144  |  109  |  29<H>  ----------------------------<  121<H>  4.5   |  19  |  1.8<H>    Ca    8.5      21 Jan 2023 06:24  Mg     2.0     01-21    TPro  5.1<L>  /  Alb  3.1<L>  /  TBili  0.4  /  DBili  x   /  AST  318<H>  /  ALT  93<H>  /  AlkPhos  64  01-21      Culture - Aspirate with Gram Stain (collected 20 Jan 2023 16:46)  Source: .Aspirate Aspirate  Gram Stain (21 Jan 2023 14:19):    No polymorphonuclear leukocytes per low power field    No organisms seen per oil power field    Culture - Blood (collected 20 Jan 2023 15:19)  Source: .Blood None  Preliminary Report (21 Jan 2023 23:01):    No growth to date.    Culture - Blood (collected 20 Jan 2023 15:19)  Source: .Blood None  Preliminary Report (21 Jan 2023 23:01):    No growth to date.

## 2023-01-21 NOTE — PROVIDER CONTACT NOTE (OTHER) - SITUATION
Pt's core temperature continues to rise despite cooling measures.  Pt's goal temp is 36 degrees.  Pt is currently 39.3 degrees.

## 2023-01-21 NOTE — CONSULT NOTE ADULT - SUBJECTIVE AND OBJECTIVE BOX
Patient is a 67y old  Female who presents with a chief complaint of cardiac arrest (2023 11:08)        HPI:  "7-year-old female no known medical history brought in by EMS as postcardiac arrest.  Patient found down at home by , last seen at home last night by family members this morning family found patient unresponsive.  Upon EMS arrival ACLS performed, patient received 10 shock, 13 epi.  ROSC achieved, patient intubated in the field.  As per ED - poke with patient's  Obi, and 2 adult children in person, all confirmed that the patient has no known medical problems, but has not seen a doctor in over 40 years and does not take any medications.  Per the family, patient was in her usual state of health, cooked her  dinner and did not have any complaints.  This morning, the  heard a gurgling sound, and thought she had a bad dream.  Upon further examination, he realized she was unresponsive, and not breathing, prompting him to call 911.    Bught in by EMS status post cardiac arrest, found gurgling by , received defibrillation x10 with a total of 450 mg of amiodarone given and 3 epinephrines, on arrival in a wide-complex rhythm, given 3 g of magnesium, calcium, insulin, bicarbonate, with narrowing of complexes, was overbreathing vent and biting tube requiring sedation, had blood from ET tube approximately 200 cc of which was suctioned."    Patient admitted to CCU. Seen in unit, children at bedside. As per ED, confirmed pt's history.  (2023 14:02)      Electrophysiology:  67y Female with no PMH, not on any meds, not seen doctor for 40yrs, was found in bed by  unconsoius, EMS was called, found to be in VT (per EMS notes), shocked x1o, eEpi x13, ROSC achieved after 20min, intubated in the field, transported to [facility]. Overnight patient became asystolic, code was called, patient went into asystole, on sternal rub cardiac rhythm reappeared. Patient became gradually bradycardic upto 30s and Hypotensive. Given 1mg of epinephrine - patient went into ventricular fibrillation - defibrillation done. Stat EKG showed - complete heart block with wide ventricular complexes. Patient started on epinephrine drip and TVP placed with pacing at 80bpm.  Cardiac enzymes  uptrending, Per resident patient is sedated,  however there is no response painful stimuli, Neuro work up pending    REVIEW OF SYSTEMS    [ ] A ten-point review of systems was otherwise negative except as noted.  [x ] Due to altered mental status/intubation, subjective information were not able to be obtained from the patient. History was obtained, to the extent possible, from review of the chart and collateral sources of information.      PAST MEDICAL & SURGICAL HISTORY:  No pertinent past medical history      No significant past surgical history        Home Medications:  none      Allergies:  No Known Allergies      FAMILY HISTORY: not contributory per chart per family      SOCIAL HISTORY: denies tobacco / ETOH / illicit drug use       PREVIOUS DIAGNOSTIC TESTING:      ECHO  FINDINGS:  < from: TTE Echo Complete w/o Contrast w/ Doppler (23 @ 14:39) >  Summary:   1. Normal left atrial size.   2. There is no evidence of pericardial effusion.   3. Mild mitral annular calcification.   4. Mild mitral valve regurgitation.   5. There is mild aortic root calcification.    < end of copied text >    STRESS  FINDINGS:    CATHETERIZATION  FINDINGS:    ELECTROPHYSIOLOGY STUDY  FINDINGS:    CAROTID ULTRASOUND:  FINDINGS    VENOUS DUPLEX SCAN:  FINDINGS:  < from: [facility] Duplex Lower Ext Vein Scan, Bilat (23 @ 14:49) >  No evidence of deep venous thrombosis in either lower extremity.    Right common femoral vein not visualized due to patient status    < end of copied text >      CHEST CT PULMONARY ANGIO with IV Contrast:  FINDINGS:  < from: CT Abdomen and Pelvis w/ IV Cont (23 @ 09:24) >  CHEST:    TUBES/LINES: Endotracheal tube projects into the right mainstem bronchus.   Recommend a 3 cm retraction. Enteric tube terminates in the stomach.   Right femoral vein catheter is in place. The tip appears to extend past   the lumen of the right common iliac vein (306/84). Clinical correlation   is advised.    PULMONARY EMBOLUS: No pulmonary embolus.    LUNGS/PLEURA/PERICARDIUM:: Trace right pleural effusion. Dense multifocal   airspace consolidation seen bilaterally centered at the lung bases and   apices suspicious for multifocal pneumonia. No pneumothorax.    CHEST WALL: Multiple right chest wall/breast intermediate density soft   tissue nodules measuring up to 3.6 x 3.5 cm. Adjacent fat stranding seen   in the right breast tissue. Subjacent mildly displaced rib fractures   involving the right anterior third fourth fifth sixth and seventh ribs.   Mildly displaced right anterior fourth, fifth, sixth, seventh rib   fractures.    MEDIASTINUM/THORACIC NODES: Unremarkable.      ABDOMEN/PELVIS:    HEPATOBILIARY: Bilobar hepatic cysts.    SPLEEN: Unremarkable.    PANCREAS: Unremarkable.    ADRENAL GLANDS: Unremarkable.    KIDNEYS: Unremarkable.    ABDOMINOPELVIC NODES: Unremarkable.    PELVIC ORGANS: Ray catheter is in place. Likely uterine fibroid with   mass effect on the endometrial stripe (306/103).    PERITONEUM/MESENTERY/BOWEL: Unremarkable.    BONES/SOFT TISSUES: Multilevel degenerative changes. Bilateral rib   fractures as detailed above.      IMPRESSION:    No evidence of a pulmonary embolism.    Endotracheal tube projects into the right mainstem bronchus. Recommend a   3 cm retraction.    Right femoral vein catheter is in place. The tip appears to extend past   the lumen of the right common iliac vein (306/84). Clinical correlation   is advised.    Dense multifocal airspace consolidation seen bilaterally centered at the   lung bases and apices suspicious for multifocal pneumonia. Trace right   pleural effusion.    Multiple right chest wall/breast intermediate density soft tissue nodules   measuring up to 3.6 x 3.5 cm. Adjacent fat stranding seen in the right   breast tissue. Differential includes posttraumatic changes with hematoma   formation versus underlying right breast malignancy.    Subjacent mildly displaced rib fractures involving the right anterior   third fourth fifth sixth and seventh ribs. Mildly displaced right   anterior fourth, fifth, sixth, seventh rib fractures.      < end of copied text >        MEDICATIONS  (STANDING):  aspirin  chewable 81 milliGRAM(s) Oral daily  atorvastatin 40 milliGRAM(s) Oral at bedtime  cefTRIAXone   IVPB      cefTRIAXone   IVPB 2000 milliGRAM(s) IV Intermittent every 24 hours  chlorhexidine 4% Liquid 1 Application(s) Topical <User Schedule>  clopidogrel Tablet 300 milliGRAM(s) Oral once  dexMEDEtomidine Infusion 0.2 MICROgram(s)/kG/Hr (5.5 mL/Hr) IV Continuous <Continuous>  EPINEPHrine    Infusion 0.03 MICROgram(s)/kG/Min (6.19 mL/Hr) IV Continuous <Continuous>  fentaNYL   Infusion. 0.5 MICROgram(s)/kG/Hr (5.5 mL/Hr) IV Continuous <Continuous>  heparin   Injectable 5000 Unit(s) IV Push once  heparin  Infusion.  Unit(s)/Hr (10 mL/Hr) IV Continuous <Continuous>  metroNIDAZOLE  IVPB 500 milliGRAM(s) IV Intermittent every 8 hours  midazolam Infusion 0.02 mG/kG/Hr (2.2 mL/Hr) IV Continuous <Continuous>  norepinephrine Infusion 0.05 MICROgram(s)/kG/Min (5.16 mL/Hr) IV Continuous <Continuous>  propofol Infusion 5 MICROgram(s)/kG/Min (2.38 mL/Hr) IV Continuous <Continuous>    MEDICATIONS  (PRN):  heparin   Injectable 6000 Unit(s) IV Push every 6 hours PRN For aPTT less than 40      Vital Signs Last 24 Hrs  T(C): 37.3 (2023 07:20), Max: 39.5 (2023 03:50)  T(F): 99.1 (2023 07:20), Max: 103.1 (2023 03:50)  HR: 79 (2023 15:35) (28 - 122)  BP: 129/73 (2023 14:00) (88/54 - 151/59)  BP(mean): 88 (2023 14:00) (66 - 121)  RR: 113 (2023 15:00) (14 - 121)  SpO2: 100% (2023 15:35) (76% - 100%)    Parameters below as of 2023 06:00  Patient On (Oxygen Delivery Method): ventilator        PHYSICAL EXAM:  ***telehealth     I&O's Detail    2023 07:01  -  2023 07:00  --------------------------------------------------------  IN:    FentaNYL: 286 mL    FentaNYL: 81 mL    Midazolam: 8 mL    Midazolam: 52 mL    Norepinephrine: 1120 mL  Total IN: 1547 mL    OUT:    Indwelling Catheter - Urethral (mL): 950 mL    Propofol: 0 mL  Total OUT: 950 mL    Total NET: 597 mL      2023 07:01  -  2023 16:11  --------------------------------------------------------  IN:    FentaNYL: 66 mL    Norepinephrine: 185 mL    Norepinephrine: 110 mL  Total IN: 361 mL    OUT:    Indwelling Catheter - Urethral (mL): 300 mL    Midazolam: 0 mL  Total OUT: 300 mL    Total NET: 61 mL        Daily     Daily     INTERPRETATION OF TELEMETRY:    EC Lead ECG:   Systolic BP 85 mmHg    Diastolic BP 34 mmHg    Ventricular Rate 36 BPM    Atrial Rate 81 BPM    QRS Duration 144 ms    Q-T Interval 684 ms    QTC Calculation(Bazett) 528 ms    P Axis 15 degrees    R Axis -83 degrees    T Axis 86 degrees    Diagnosis Line Sinus  rhythm with third degree AV block with junctional escape rhythm.  Occassional PVC  Abnormal ECG    Confirmed by Ruddy Sher ([id number]) on 2023 10:31:27 AM (23 @ 03:12)    12 Lead ECG:   Ventricular Rate 71 BPM    Atrial Rate 32 BPM    QRS Duration 176 ms    Q-T Interval 426 ms    QTC Calculation(Bazett) 462 ms    R Axis 111 degrees    T Axis -40 degrees    Diagnosis Line Sinus bradycardia 1st degree A-V block Premature ventricularcomplexes  Right bundle branch block  Left posterior fascicular block  *** Bifascicular block ***      Confirmed by CALE HOLM, FRANCOISE ([id number]) on 2023 10:46:10 AM (23 @ 06:50)        LABS:                        13.9   24.68 )-----------( 124      ( 2023 06:24 )             42.6         144  |  109  |  29<H>  ----------------------------<  121<H>  4.5   |  19  |  1.8<H>    Ca    8.5      2023 06:24  Mg     2.0         TPro  5.1<L>  /  Alb  3.1<L>  /  TBili  0.4  /  DBili  x   /  AST  318<H>  /  ALT  93<H>  /  AlkPhos  64      CARDIAC MARKERS ( 2023 11:00 )  x     / 6.53 ng/mL / x     / x     / x      CARDIAC MARKERS ( 2023 06:24 )  x     / 10.53 ng/mL / 2498 U/L / x     / 224.5 ng/mL  CARDIAC MARKERS ( 2023 22:00 )  x     / 10.89 ng/mL / x     / x     / x      CARDIAC MARKERS ( 2023 15:19 )  x     / 9.45 ng/mL / x     / x     / x      CARDIAC MARKERS ( 2023 07:16 )  x     / 0.61 ng/mL / x     / x     / x          PT/INR - ( 2023 07:16 )   PT: 11.40 sec;   INR: 1.00 ratio         PTT - ( 2023 06:24 )  PTT:30.8 sec  Urinalysis Basic - ( 2023 16:45 )    Color: Yellow / Appearance: Slightly Cloudy / S.015 / pH: x  Gluc: x / Ketone: Negative  / Bili: Negative / Urobili: 0.2 mg/dL   Blood: x / Protein: 100 mg/dL / Nitrite: Negative   Leuk Esterase: Negative / RBC: 26-50 /HPF / WBC 3-5 /HPF   Sq Epi: x / Non Sq Epi: Few /HPF / Bacteria: Few      BNP      Culture - Aspirate with Gram Stain (collected 23 @ 16:46)  Source: .Aspirate Aspirate  Gram Stain (23 @ 14:19):    No polymorphonuclear leukocytes per low power field    No organisms seen per oil power field      COVID-19 PCR: NotDetec (23 @ 08:00)        RADIOLOGY & ADDITIONAL STUDIES:

## 2023-01-21 NOTE — CONSULT NOTE ADULT - SUBJECTIVE AND OBJECTIVE BOX
HPI:  "7-year-old female no known medical history brought in by EMS as postcardiac arrest.  Patient found down at home by , last seen at home last night by family members this morning family found patient unresponsive.  Upon EMS arrival ACLS performed, patient received 10 shock, 13 epi.  ROSC achieved, patient intubated in the field.  As per ED - poke with patient's  Obi, and 2 adult children in person, all confirmed that the patient has no known medical problems, but has not seen a doctor in over 40 years and does not take any medications.  Per the family, patient was in her usual state of health, cooked her  dinner and did not have any complaints.  This morning, the  heard a gurgling sound, and thought she had a bad dream.  Upon further examination, he realized she was unresponsive, and not breathing, prompting him to call 911.    Bught in by EMS status post cardiac arrest, found gurgling by , received defibrillation x10 with a total of 450 mg of amiodarone given and 3 epinephrines, on arrival in a wide-complex rhythm, given 3 g of magnesium, calcium, insulin, bicarbonate, with narrowing of complexes, was overbreathing vent and biting tube requiring sedation, had blood from ET tube approximately 200 cc of which was suctioned."    Patient admitted to CCU. Seen in unit, children at bedside. As per ED, confirmed pt's history.  (20 Jan 2023 14:02)      Cardiology Update: 68 y/o f w/ no significant medical history BIBEMS for cardiac arrest s/p shocks, epi, amio 45 min down time leading to rosc admitte to CCU for complete HB on TVP/ AHRF/ possible multifocal pna/ + RVP/ sepsis/  intubated on pressors found to have elevated troponin. History per family, pt with no specific complaints prior to event, denies chest pain, sob, palpitation, fever/chills, recent illness or sick contacts, non -smoker, has never seen a cardiologist.         PAST MEDICAL & SURGICAL HISTORY  No pertinent past medical history    No significant past surgical history        FAMILY HISTORY:  FAMILY HISTORY:      SOCIAL HISTORY:  []smoker  []Alcohol  []Drug    ALLERGIES:  No Known Allergies      MEDICATIONS:  MEDICATIONS  (STANDING):  aspirin  chewable 81 milliGRAM(s) Oral daily  atorvastatin 40 milliGRAM(s) Oral at bedtime  cefTRIAXone   IVPB      cefTRIAXone   IVPB 2000 milliGRAM(s) IV Intermittent every 24 hours  chlorhexidine 4% Liquid 1 Application(s) Topical <User Schedule>  clopidogrel Tablet 300 milliGRAM(s) Oral once  dexMEDEtomidine Infusion 0.2 MICROgram(s)/kG/Hr (5.5 mL/Hr) IV Continuous <Continuous>  EPINEPHrine    Infusion 0.03 MICROgram(s)/kG/Min (6.19 mL/Hr) IV Continuous <Continuous>  fentaNYL   Infusion. 0.5 MICROgram(s)/kG/Hr (5.5 mL/Hr) IV Continuous <Continuous>  heparin   Injectable 5000 Unit(s) IV Push once  heparin  Infusion.  Unit(s)/Hr (10 mL/Hr) IV Continuous <Continuous>  metroNIDAZOLE  IVPB 500 milliGRAM(s) IV Intermittent every 8 hours  midazolam Infusion 0.02 mG/kG/Hr (2.2 mL/Hr) IV Continuous <Continuous>  norepinephrine Infusion 0.05 MICROgram(s)/kG/Min (5.16 mL/Hr) IV Continuous <Continuous>  propofol Infusion 5 MICROgram(s)/kG/Min (2.38 mL/Hr) IV Continuous <Continuous>    MEDICATIONS  (PRN):  heparin   Injectable 6000 Unit(s) IV Push every 6 hours PRN For aPTT less than 40      HOME MEDICATIONS:  Home Medications:      VITALS:   T(F): 99.1 (01-21 @ 07:20), Max: 103.1 (01-21 @ 03:50)  HR: 79 (01-21 @ 10:00) (28 - 122)  BP: 106/58 (01-21 @ 10:00) (94/53 - 174/103)  BP(mean): 79 (01-21 @ 10:00) (68 - 121)  RR: 113 (01-21 @ 10:00) (0 - 120)  SpO2: 100% (01-21 @ 10:00) (76% - 100%)    I&O's Summary    20 Jan 2023 07:01  -  21 Jan 2023 07:00  --------------------------------------------------------  IN: 1547 mL / OUT: 950 mL / NET: 597 mL    21 Jan 2023 07:01  -  21 Jan 2023 11:09  --------------------------------------------------------  IN: 143 mL / OUT: 90 mL / NET: 53 mL        REVIEW OF SYSTEMS:  CONSTITUTIONAL: No weakness, fevers or chills  EYES: No visual changes  ENT: No vertigo or throat pain   NECK: No pain or stiffness  RESPIRATORY: No cough, wheezing, hemoptysis; No shortness of breath  CARDIOVASCULAR: No chest pain or palpitations  GASTROINTESTINAL: No abdominal or epigastric pain. No nausea, vomiting, or hematemesis; No diarrhea or constipation. No melena or hematochezia.  GENITOURINARY: No dysuria, frequency or hematuria  NEUROLOGICAL: No numbness or weakness  SKIN: No itching, no rashes  MSK: no    PHYSICAL EXAM:  NEURO: patient is awake , alert and oriented  GEN: Not in acute distress  NECK: no thyroid enlargement, no JVD  LUNGS: Clear to auscultation bilaterally   CARDIOVASCULAR: S1/S2 present, RRR , no murmurs or rubs, no carotid bruits,  + PP bilaterally  ABD: Soft, non-tender, non-distended, +BS  EXT: No CHEIKH  SKIN: Intact    LABS:                        13.9   24.68 )-----------( 124      ( 21 Jan 2023 06:24 )             42.6     01-21    144  |  109  |  29<H>  ----------------------------<  121<H>  4.5   |  19  |  1.8<H>    Ca    8.5      21 Jan 2023 06:24  Mg     2.0     01-21    TPro  5.1<L>  /  Alb  3.1<L>  /  TBili  0.4  /  DBili  x   /  AST  318<H>  /  ALT  93<H>  /  AlkPhos  64  01-21    PT/INR - ( 20 Jan 2023 07:16 )   PT: 11.40 sec;   INR: 1.00 ratio         PTT - ( 21 Jan 2023 06:24 )  PTT:30.8 sec  Troponin T, Serum: 10.53 ng/mL *HH* (01-21-23 @ 06:24)  Creatine Kinase, Serum: 2498 U/L *H* (01-21-23 @ 06:24)  Troponin T, Serum: 10.89 ng/mL ** (01-20-23 @ 22:00)  Lactate, Blood: 2.7 mmol/L *H* (01-20-23 @ 21:26)  Troponin T, Serum: 9.45 ng/mL ** (01-20-23 @ 15:19)    CARDIAC MARKERS ( 21 Jan 2023 06:24 )  x     / 10.53 ng/mL / 2498 U/L / x     / 224.5 ng/mL  CARDIAC MARKERS ( 20 Jan 2023 22:00 )  x     / 10.89 ng/mL / x     / x     / x      CARDIAC MARKERS ( 20 Jan 2023 15:19 )  x     / 9.45 ng/mL / x     / x     / x      CARDIAC MARKERS ( 20 Jan 2023 07:16 )  x     / 0.61 ng/mL / x     / x     / x            Troponin trend:      01-21 Chol 179 LDL -- HDL 43<L> Trig 161<H>      RADIOLOGY:  < from: 12 Lead ECG (01.21.23 @ 03:12) >  Systolic BP 85 mmHg    Diastolic BP 34 mmHg    Ventricular Rate 36 BPM    Atrial Rate 81 BPM    QRS Duration 144 ms    Q-T Interval 684 ms    QTC Calculation(Bazett) 528 ms    P Axis 15 degrees    R Axis -83 degrees    T Axis 86 degrees    Diagnosis Line Sinus  rhythm with third degree AV block with junctional escape rhythm.  Occassional PVC  Abnormal ECG    Confirmed by Ruddy Sher ([id number]) on 1/21/2023 10:31:27 AM    < end of copied text >      ECG:    TELEMETRY EVENTS:

## 2023-01-21 NOTE — PROGRESS NOTE ADULT - ASSESSMENT
Acute hypoxemic respiratory failure and prolonged cardiopulmonary arrest, s/p CPR  - cooling blanket  - neuro f/u  - d/w  and family, its certain that there will be permanent brain damage, extent unknown but it is likely to be very significant  - poor prognosis

## 2023-01-21 NOTE — CONSULT NOTE ADULT - CONSULT REASON
S/P cardiac arrest
s/p cardiac arrest
Patient sp cardiac arrest
s/p CPA
Multifocal Pneumonia
Cardiac arrest, CHB, s/p TVP

## 2023-01-21 NOTE — CHART NOTE - NSCHARTNOTEFT_GEN_A_CORE
Code blue called at ~2:50am; patient went into asystole, on sternal rub cardiac rhythm reappeared. Patient became gradually bradycardic upto 30s and Hypotensive. Given 1mg of epinephrine - patient went into ventricular fibrillation - defibrillation done. Stat EKG showed - complete heart block with wide ventricular complexes. Patient started on epinephrine drip and TVP placed with pacing at 80bpm. Repeat trops uptrending - discussed with cardiology fellow on call - patient not a candidate for PCI, recommended medical management with IV heparin, Aspirin, plavix, and repeat Echo tomorrow.   Updated patient's  of patient's condition, who wished the patient to be DNR.

## 2023-01-21 NOTE — CONSULT NOTE ADULT - SUBJECTIVE AND OBJECTIVE BOX
Neurology Consult    Patient is a 67y old  Female who presents with a chief complaint of Cardiac arrest (2023 17:11)      HPI:  "7-year-old female no known medical history brought in by EMS as postcardiac arrest.  Patient found down at home by , last seen at home last night by family members this morning family found patient unresponsive.  Upon EMS arrival ACLS performed, patient received 10 shock, 13 epi.  ROSC achieved, patient intubated in the field.  As per ED - poke with patient's  Obi, and 2 adult children in person, all confirmed that the patient has no known medical problems, but has not seen a doctor in over 40 years and does not take any medications.  Per the family, patient was in her usual state of health, cooked her  dinner and did not have any complaints.  This morning, the  heard a gurgling sound, and thought she had a bad dream.  Upon further examination, he realized she was unresponsive, and not breathing, prompting him to call 911.    Bught in by EMS status post cardiac arrest, found gurgling by , received defibrillation x10 with a total of 450 mg of amiodarone given and 3 epinephrines, on arrival in a wide-complex rhythm, given 3 g of magnesium, calcium, insulin, bicarbonate, with narrowing of complexes, was overbreathing vent and biting tube requiring sedation, had blood from ET tube approximately 200 cc of which was suctioned."    Patient admitted to CCU. Seen in unit, children at bedside. As per ED, confirmed pt's history.  (2023 14:02)      PAST MEDICAL & SURGICAL HISTORY:  No pertinent past medical history      No significant past surgical history          FAMILY HISTORY:      Social History: (-) x 3    Allergies    No Known Allergies    Intolerances        MEDICATIONS  (STANDING):  aspirin  chewable 81 milliGRAM(s) Oral daily  atorvastatin 40 milliGRAM(s) Oral at bedtime  cefTRIAXone   IVPB      cefTRIAXone   IVPB 2000 milliGRAM(s) IV Intermittent every 24 hours  chlorhexidine 4% Liquid 1 Application(s) Topical <User Schedule>  clopidogrel Tablet 300 milliGRAM(s) Oral once  dexMEDEtomidine Infusion 0.2 MICROgram(s)/kG/Hr (5.5 mL/Hr) IV Continuous <Continuous>  EPINEPHrine    Infusion 0.03 MICROgram(s)/kG/Min (6.19 mL/Hr) IV Continuous <Continuous>  fentaNYL   Infusion. 0.5 MICROgram(s)/kG/Hr (5.5 mL/Hr) IV Continuous <Continuous>  heparin   Injectable 5000 Unit(s) IV Push once  heparin  Infusion.  Unit(s)/Hr (10 mL/Hr) IV Continuous <Continuous>  metroNIDAZOLE  IVPB 500 milliGRAM(s) IV Intermittent every 8 hours  midazolam Infusion 0.02 mG/kG/Hr (2.2 mL/Hr) IV Continuous <Continuous>  norepinephrine Infusion 0.05 MICROgram(s)/kG/Min (5.16 mL/Hr) IV Continuous <Continuous>  propofol Infusion 5 MICROgram(s)/kG/Min (2.38 mL/Hr) IV Continuous <Continuous>    MEDICATIONS  (PRN):  heparin   Injectable 6000 Unit(s) IV Push every 6 hours PRN For aPTT less than 40        Vital Signs Last 24 Hrs  T(C): 37.3 (2023 07:20), Max: 39.5 (2023 03:50)  T(F): 99.1 (2023 07:20), Max: 103.1 (2023 03:50)  HR: 79 (2023 10:00) (28 - 122)  BP: 106/58 (2023 10:00) (94/53 - 153/67)  BP(mean): 79 (2023 10:00) (68 - 121)  RR: 113 (2023 10:00) (0 - 120)  SpO2: 100% (2023 10:00) (76% - 100%)    Parameters below as of 2023 06:00  Patient On (Oxygen Delivery Method): ventilator        Examination:  General:  Intubated, not breathing over vent  Not responsive to painful stimuli  Pupils minimally reactive to none  left corneal responsive, once only         Labs:   CBC Full  -  ( 2023 06:24 )  WBC Count : 24.68 K/uL  RBC Count : 5.03 M/uL  Hemoglobin : 13.9 g/dL  Hematocrit : 42.6 %  Platelet Count - Automated : 124 K/uL  Mean Cell Volume : 84.7 fL  Mean Cell Hemoglobin : 27.6 pg  Mean Cell Hemoglobin Concentration : 32.6 g/dL  Auto Neutrophil # : 18.12 K/uL  Auto Lymphocyte # : 3.29 K/uL  Auto Monocyte # : 3.04 K/uL  Auto Eosinophil # : 0.00 K/uL  Auto Basophil # : 0.10 K/uL  Auto Neutrophil % : 73.5 %  Auto Lymphocyte % : 13.3 %  Auto Monocyte % : 12.3 %  Auto Eosinophil % : 0.0 %  Auto Basophil % : 0.4 %        144  |  109  |  29<H>  ----------------------------<  121<H>  4.5   |  19  |  1.8<H>    Ca    8.5      2023 06:24  Mg     2.0         TPro  5.1<L>  /  Alb  3.1<L>  /  TBili  0.4  /  DBili  x   /  AST  318<H>  /  ALT  93<H>  /  AlkPhos  64      LIVER FUNCTIONS - ( 2023 06:24 )  Alb: 3.1 g/dL / Pro: 5.1 g/dL / ALK PHOS: 64 U/L / ALT: 93 U/L / AST: 318 U/L / GGT: x           PT/INR - ( 2023 07:16 )   PT: 11.40 sec;   INR: 1.00 ratio         PTT - ( 2023 06:24 )  PTT:30.8 sec  Urinalysis Basic - ( 2023 16:45 )    Color: Yellow / Appearance: Slightly Cloudy / S.015 / pH: x  Gluc: x / Ketone: Negative  / Bili: Negative / Urobili: 0.2 mg/dL   Blood: x / Protein: 100 mg/dL / Nitrite: Negative   Leuk Esterase: Negative / RBC: 26-50 /HPF / WBC 3-5 /HPF   Sq Epi: x / Non Sq Epi: Few /HPF / Bacteria: Few          Neuroimaging:  [facility]:     23 @ 11:08       No

## 2023-01-21 NOTE — CONSULT NOTE ADULT - NS ATTEND AMEND GEN_ALL_CORE FT
TVP   Critical care f-up  TTE  Ischemic work-up when stable  Will consider PPM once recovers   Recall when ready

## 2023-01-21 NOTE — PROCEDURE NOTE - NSICDXPROCEDURE_GEN_ALL_CORE_FT
PROCEDURES:  Insertion, temporary transvenous cardiac pacemaker, right ventricle 21-Jan-2023 05:50:53  Joseph Boswell

## 2023-01-21 NOTE — CONSULT NOTE ADULT - ASSESSMENT
66yo Female s/p cardiac arrest, shocked x10, ROSC after 20min, asystolic arrest overnight with VT requiring shocks, CHB requiring TVP    Cardiac arrest  cannot r/o ischemia  poor prognosis  poor Neuro status      con't current management  con't TVP  Maintain electrolytes K>4.0 Mg >2.5   When recovers will recommend PPM placement  GOC discussion once recovers regarding ischemic work up and ICD as patient is currently DNR  will follow

## 2023-01-22 NOTE — PROGRESS NOTE ADULT - SUBJECTIVE AND OBJECTIVE BOX
Patient seen and evaluated this am, sedated on ventilator      T(F): 102 (01-22-23 @ 07:00), Max: 102.4 (01-21-23 @ 15:27)  HR: 71 (01-22-23 @ 09:00)  BP: 121/73 (01-22-23 @ 08:00)  RR: 20  SpO2: 99% (01-22-23 @ 09:00) (99% - 100%)    PHYSICAL EXAM:  GENERAL: NAD  HEAD:  Atraumatic, Normocephalic  EYES: EOMI, PERRLA, conjunctiva and sclera clear  NERVOUS SYSTEM:  positive gag   CHEST/LUNG:  bilateral rhonchi  HEART: Regular rate and rhythm; No murmurs, rubs, or gallops  ABDOMEN: Soft, Nontender, Nondistended; Bowel sounds present  EXTREMITIES:  2+ Peripheral Pulses, No clubbing, cyanosis, or edema    LABS  01-22    143  |  111<H>  |  35<H>  ----------------------------<  137<H>  4.0   |  20  |  1.3    Ca    7.7<L>      22 Jan 2023 06:07  Mg     2.4     01-22    TPro  4.7<L>  /  Alb  2.8<L>  /  TBili  0.5  /  DBili  x   /  AST  163<H>  /  ALT  58<H>  /  AlkPhos  62  01-22                          12.1   22.01 )-----------( 141      ( 22 Jan 2023 06:07 )             37.0     PTT - ( 22 Jan 2023 06:07 )  PTT:51.0 sec    Mode: AC/ CMV (Assist Control/ Continuous Mandatory Ventilation)  RR (machine): 25  TV (machine): 400  FiO2: 40  PEEP: 8    CARDIAC ENZYMES  Creatine Kinase, Serum: 2498 (01-21 @ 06:24)    CKMB Units: 224.5 (01-21 @ 06:24)    Troponin T, Serum: 4.32 ng/mL (01-22-23 @ 06:07)  Troponin T, Serum: 6.53 ng/mL (01-21-23 @ 11:00)  Troponin T, Serum: 10.53 ng/mL (01-21-23 @ 06:24)  Troponin T, Serum: 10.89 ng/mL (01-20-23 @ 22:00)  Troponin T, Serum: 9.45 ng/mL (01-20-23 @ 15:19)  Troponin T, Serum: 0.61 ng/mL (01-20-23 @ 07:16)      Culture Results:   No growth (01-20-23)  Culture Results:   No growth to date. (01-20-23)  Culture Results:   No growth to date. (01-20-23)    RADIOLOGY  < from: Xray Chest 1 View-PORTABLE IMMEDIATE (Xray Chest 1 View-PORTABLE IMMEDIATE .) (01.21.23 @ 07:16) >  2.  Stable bilateral perihilar pulmonary opacities.    < end of copied text >    MEDICATIONS  (STANDING):  aspirin  chewable 81 milliGRAM(s) Oral daily  atorvastatin 40 milliGRAM(s) Oral at bedtime  busPIRone 30 milliGRAM(s) Oral every 8 hours    cefTRIAXone   IVPB 2000 milliGRAM(s) IV Intermittent every 24 hours  chlorhexidine 4% Liquid 1 Application(s) Topical <User Schedule>  clopidogrel Tablet 300 milliGRAM(s) Oral once  clopidogrel Tablet 75 milliGRAM(s) Oral daily  dexMEDEtomidine Infusion 0.2 MICROgram(s)/kG/Hr (5.5 mL/Hr) IV Continuous <Continuous>  EPINEPHrine    Infusion 0.03 MICROgram(s)/kG/Min (6.19 mL/Hr) IV Continuous <Continuous>  fentaNYL   Infusion. 0.5 MICROgram(s)/kG/Hr (5.5 mL/Hr) IV Continuous <Continuous>  heparin  Infusion.  Unit(s)/Hr (10 mL/Hr) IV Continuous <Continuous>  metroNIDAZOLE  IVPB 500 milliGRAM(s) IV Intermittent every 8 hours  midazolam Infusion 0.02 mG/kG/Hr (2.2 mL/Hr) IV Continuous <Continuous>  norepinephrine Infusion 0.05 MICROgram(s)/kG/Min (5.16 mL/Hr) IV Continuous <Continuous>  propofol Infusion 5 MICROgram(s)/kG/Min (2.38 mL/Hr) IV Continuous <Continuous>    MEDICATIONS  (PRN):  heparin   Injectable 6000 Unit(s) IV Push every 6 hours PRN For aPTT less than 40

## 2023-01-22 NOTE — PROGRESS NOTE ADULT - SUBJECTIVE AND OBJECTIVE BOX
Patient seen and examined    Pupils pinpoint  no corneal  no gag  no reflexes       A?P  spoke with family poor prognosis  EEG?

## 2023-01-22 NOTE — PROGRESS NOTE ADULT - ASSESSMENT
67 year old F with no past medical history (does not see doctors) was found by   unresponsive - called EMS who found the pt pulseless and coded pt ~45min s/p 10 shocks then ROSC.  at bedside reports pt was feeling  fine, not sick, no chest pains or SOB.        prolonged Cardiac arrest / aspiration pneumonia - with possible sepsis POA  / multiple rib fractures from CPR / suspected anoxic brain injury / CHB s/p TVP     - complete  targeted temperature management protocol   - pressors to maintain MAP >65   - broad spectrum antibiotics   - follow procal   - very poor prognosis - family aware

## 2023-01-22 NOTE — PROGRESS NOTE ADULT - SUBJECTIVE AND OBJECTIVE BOX
Patient is a 67y old  Female who presents with a chief complaint of cardiac arrest (2023 11:08)      Over Night Events:  Patient seen and examined.   on vent on levo   and epi   pace maker transvenous     ROS:  See HPI    PHYSICAL EXAM    ICU Vital Signs Last 24 Hrs  T(C): 38.9 (2023 04:00), Max: 39.1 (2023 15:27)  T(F): 102 (2023 04:00), Max: 102.4 (2023 15:27)  HR: 72 (2023 06:00) (70 - 82)  BP: 114/62 (2023 06:00) (88/54 - 151/59)  BP(mean): 84 (2023 06:00) (66 - 121)  ABP: 105/57 (2023 06:00) (81/47 - 134/63)  ABP(mean): 73 (2023 06:00) (59 - 87)  RR: 25 (2023 06:00) (20 - 133)  SpO2: 100% (2023 06:00) (100% - 100%)    O2 Parameters below as of 2023 00:00  Patient On (Oxygen Delivery Method): ventilator    O2 Concentration (%): 60        General: ill looking   HEENT:       et tube         Lymph Nodes: NO cervical LN   Lungs: Bilateral BS  Cardiovascular: Regular   Abdomen: Soft, Positive BS  Extremities: No clubbing   Skin: warm   Neurological: positive gag  Musculoskeletal: move all ext     I&O's Detail    2023 07:01  -  2023 07:00  --------------------------------------------------------  IN:    FentaNYL: 286 mL    FentaNYL: 81 mL    Midazolam: 8 mL    Midazolam: 52 mL    Norepinephrine: 1120 mL  Total IN: 1547 mL    OUT:    Indwelling Catheter - Urethral (mL): 950 mL    Propofol: 0 mL  Total OUT: 950 mL    Total NET: 597 mL      2023 07:01  -  2023 06:07  --------------------------------------------------------  IN:    FentaNYL: 229.5 mL    Heparin Infusion: 90 mL    Norepinephrine: 110 mL    Norepinephrine: 375 mL    Oral Fluid: 30 mL  Total IN: 834.5 mL    OUT:    Indwelling Catheter - Urethral (mL): 715 mL    Midazolam: 0 mL  Total OUT: 715 mL    Total NET: 119.5 mL          LABS:                          13.9   24.68 )-----------( 124      ( 2023 06:24 )             42.6         2023 06:24    144    |  109    |  29     ----------------------------<  121    4.5     |  19     |  1.8      Ca    8.5        2023 06:24  Mg     2.0       2023 06:24    TPro  5.1    /  Alb  3.1    /  TBili  0.4    /  DBili  x      /  AST  318    /  ALT  93     /  AlkPhos  64     2023 06:24  Amylase x     lipase x                                                 PT/INR - ( 2023 07:16 )   PT: 11.40 sec;   INR: 1.00 ratio         PTT - ( 2023 00:39 )  PTT:57.7 sec                                       Urinalysis Basic - ( 2023 16:45 )    Color: Yellow / Appearance: Slightly Cloudy / S.015 / pH: x  Gluc: x / Ketone: Negative  / Bili: Negative / Urobili: 0.2 mg/dL   Blood: x / Protein: 100 mg/dL / Nitrite: Negative   Leuk Esterase: Negative / RBC: 26-50 /HPF / WBC 3-5 /HPF   Sq Epi: x / Non Sq Epi: Few /HPF / Bacteria: Few        Lactate, Blood: 2.7 mmol/L (23 @ 21:26)    CARDIAC MARKERS ( 2023 11:00 )  x     / 6.53 ng/mL / x     / x     / x      CARDIAC MARKERS ( 2023 06:24 )  x     / 10.53 ng/mL / 2498 U/L / x     / 224.5 ng/mL  CARDIAC MARKERS ( 2023 22:00 )  x     / 10.89 ng/mL / x     / x     / x      CARDIAC MARKERS ( 2023 15:19 )  x     / 9.45 ng/mL / x     / x     / x      CARDIAC MARKERS ( 2023 07:16 )  x     / 0.61 ng/mL / x     / x     / x                                                            Culture - Aspirate with Gram Stain (collected 2023 16:46)  Source: .Aspirate Aspirate  Gram Stain (2023 14:19):    No polymorphonuclear leukocytes per low power field    No organisms seen per oil power field    Culture - Blood (collected 2023 15:19)  Source: .Blood None  Preliminary Report (2023 23:01):    No growth to date.    Culture - Blood (collected 2023 15:19)  Source: .Blood None  Preliminary Report (2023 23:01):    No growth to date.                                                   Mode: AC/ CMV (Assist Control/ Continuous Mandatory Ventilation)  RR (machine): 28  TV (machine): 400  FiO2: 60  PEEP: 8  MAP: 13  PIP: 25                                      ABG - ( 2023 03:13 )  pH, Arterial: 7.43  pH, Blood: x     /  pCO2: 30    /  pO2: 147   / HCO3: 20    / Base Excess: -3.4  /  SaO2: 99.3                MEDICATIONS  (STANDING):  aspirin  chewable 81 milliGRAM(s) Oral daily  atorvastatin 40 milliGRAM(s) Oral at bedtime  cefTRIAXone   IVPB      cefTRIAXone   IVPB 2000 milliGRAM(s) IV Intermittent every 24 hours  chlorhexidine 4% Liquid 1 Application(s) Topical <User Schedule>  clopidogrel Tablet 300 milliGRAM(s) Oral once  clopidogrel Tablet 75 milliGRAM(s) Oral daily  dexMEDEtomidine Infusion 0.2 MICROgram(s)/kG/Hr (5.5 mL/Hr) IV Continuous <Continuous>  EPINEPHrine    Infusion 0.03 MICROgram(s)/kG/Min (6.19 mL/Hr) IV Continuous <Continuous>  fentaNYL   Infusion. 0.5 MICROgram(s)/kG/Hr (5.5 mL/Hr) IV Continuous <Continuous>  heparin   Injectable 5000 Unit(s) IV Push once  heparin  Infusion.  Unit(s)/Hr (10 mL/Hr) IV Continuous <Continuous>  metroNIDAZOLE  IVPB 500 milliGRAM(s) IV Intermittent every 8 hours  midazolam Infusion 0.02 mG/kG/Hr (2.2 mL/Hr) IV Continuous <Continuous>  norepinephrine Infusion 0.05 MICROgram(s)/kG/Min (5.16 mL/Hr) IV Continuous <Continuous>  propofol Infusion 5 MICROgram(s)/kG/Min (2.38 mL/Hr) IV Continuous <Continuous>    MEDICATIONS  (PRN):  heparin   Injectable 6000 Unit(s) IV Push every 6 hours PRN For aPTT less than 40          Xrays:  TLC:  OG:  ET tube:                                                                                    b/lopacity and congestion    ECHO:  [facility] ICU:

## 2023-01-22 NOTE — PROGRESS NOTE ADULT - ASSESSMENT
IMPRESSION:  Acute hypoxemic respiratory failure  cardio pulmonary arrest  x45min, 13 rounds of epi, 10 shocks  prolonged down time  Multifocal pneumonia possible aspiration   Acute respiratory distress syndrome  Sepsis present on admission  likely anoxic brain damage  enterovirus   PLAN:    CNS: EEG if no seizure do SAT to assess mental status   CT when stable   neurology eval      HEENT: Oral care    PULMONARY:  HOB @ 45 degrees.   increase rate to 25 decrease fio2 to 50%    CARDIOVASCULAR: ECHO. Trend CE. follow cardiology   keep IS =OS   taper pressors to map 65 try to taper off epi first   Avoid volume overload. BNP.  cardiac management    GI: GI prophylaxis. NPO check LA     RENAL:  Follow up lytes.  Correct as needed. Ray.     INFECTIOUS DISEASE: PanCx. Procal. Fungitell.   Nasal MRSA. DTA.   UA.   Serum galactomannan.   Urine strep/legionella. 2 sets of BCx. Full RVP.   abx as per ID   if spike again start vanco and switch rocephine to cefepime     HEMATOLOGICAL:  DVT prophylaxis. Dimer, LE venous duplex.     ENDOCRINE:  Follow up FS. Target -180. TSH.     MUSCULOSKELETAL: bedrest        MICU  very poor prognosis   DNR

## 2023-01-22 NOTE — INITIAL ORGAN DONATION REFERRAL - NSORGANDONATIONCLINICALTRIGGER_GEN_ALL_CORE
[place] Coma Scale is less than or equal to 5/Family discussion withdrawal of life-sustaining therapies is anticipated High Dose Vitamin A Counseling: Side effects reviewed, pt to contact office should one occur.

## 2023-01-23 NOTE — DIETITIAN INITIAL EVALUATION ADULT - NSFNSGIIOFT_GEN_A_CORE
01-23-23 @ 07:01  -  01-23-23 @ 19:43  --------------------------------------------------------  OUT:  Total OUT: 0 mL    Total NET: 440 mL

## 2023-01-23 NOTE — PROGRESS NOTE ADULT - SUBJECTIVE AND OBJECTIVE BOX
Neurology Follow up note    Name  CT MEDINA    HPI:  "67-year-old female no known medical history brought in by EMS as postcardiac arrest.  Patient found down at home by , last seen at home last night by family members this morning family found patient unresponsive.  Upon EMS arrival ACLS performed, patient received 10 shock, 13 epi.  ROSC achieved, patient intubated in the field.  As per ED - poke with patient's  Obi, and 2 adult children in person, all confirmed that the patient has no known medical problems, but has not seen a doctor in over 40 years and does not take any medications.  Per the family, patient was in her usual state of health, cooked her  dinner and did not have any complaints.  This morning, the  heard a gurgling sound, and thought she had a bad dream.  Upon further examination, he realized she was unresponsive, and not breathing, prompting him to call 911.    Bught in by EMS status post cardiac arrest, found gurgling by , received defibrillation x10 with a total of 450 mg of amiodarone given and 3 epinephrines, on arrival in a wide-complex rhythm, given 3 g of magnesium, calcium, insulin, bicarbonate, with narrowing of complexes, was overbreathing vent and biting tube requiring sedation, had blood from ET tube approximately 200 cc of which was suctioned."    Patient admitted to CCU. Seen in unit, children at bedside. As per ED, confirmed pt's history.  (20 Jan 2023 14:02)      Neurology Interval History - Pt intubated and sedated on midodrine, fentanyl and pressors. No seizures overnight. Pt with gag reflex, corneal reflex positive. frowns to painful stimulus on LLE L>R          Vital Signs Last 24 Hrs  T(C): -15.5 (23 Jan 2023 09:00), Max: 38.2 (22 Jan 2023 15:30)  T(F): 100.2 (23 Jan 2023 09:00), Max: 100.8 (22 Jan 2023 15:30)  HR: 63 (23 Jan 2023 10:30) (41 - 70)  BP: 119/51 (23 Jan 2023 09:15) (100/74 - 119/51)  BP(mean): 73 (23 Jan 2023 09:15) (73 - 83)  RR: 22 (23 Jan 2023 10:30) (18 - 25)  SpO2: 96% (23 Jan 2023 10:30) (96% - 100%)    Parameters below as of 22 Jan 2023 21:00  Patient On (Oxygen Delivery Method): ventilator      ICU Vital Signs Last 24 Hrs  T(C): -15.5 (23 Jan 2023 09:00), Max: 38.2 (22 Jan 2023 15:30)  T(F): 100.2 (23 Jan 2023 09:00), Max: 100.8 (22 Jan 2023 15:30)  HR: 63 (23 Jan 2023 10:30) (41 - 70)  BP: 119/51 (23 Jan 2023 09:15) (100/74 - 119/51)  BP(mean): 73 (23 Jan 2023 09:15) (73 - 83)  ABP: 128/55 (23 Jan 2023 10:30) (86/45 - 159/74)  ABP(mean): 77 (23 Jan 2023 10:30) (60 - 101)  RR: 22 (23 Jan 2023 10:30) (18 - 25)  SpO2: 96% (23 Jan 2023 10:30) (96% - 100%)    O2 Parameters below as of 22 Jan 2023 21:00  Patient On (Oxygen Delivery Method): ventilator      Neurological Exam:   Mental status:  intubated , sedated on midazolam , fentanyl on pressor. unable to follow up command, sedated.   Cranial nerves:  pupils equally round and reactive to light, pinpoint sluggishly  reactive. Corneal reflex present. gag reflex present.    Motor:  no spontaneous movement,    Sensation: frowns to painful stimulus on b/l LE L>R.       Medications  aspirin  chewable 81 milliGRAM(s) Oral daily  atorvastatin 40 milliGRAM(s) Oral at bedtime  busPIRone 30 milliGRAM(s) Oral every 8 hours  chlorhexidine 4% Liquid 1 Application(s) Topical <User Schedule>  clopidogrel Tablet 300 milliGRAM(s) Oral once  clopidogrel Tablet 75 milliGRAM(s) Oral daily  fentaNYL   Infusion. 0.5 MICROgram(s)/kG/Hr IV Continuous <Continuous>  midazolam Infusion 0.02 mG/kG/Hr IV Continuous <Continuous>  norepinephrine Infusion 0.05 MICROgram(s)/kG/Min IV Continuous <Continuous>  piperacillin/tazobactam IVPB.- 3.375 Gram(s) IV Intermittent once  piperacillin/tazobactam IVPB.. 3.375 Gram(s) IV Intermittent every 6 hours  propofol Infusion 5 MICROgram(s)/kG/Min IV Continuous <Continuous>      Lab                        10.7   22.54 )-----------( 162      ( 23 Jan 2023 06:15 )             33.1     01-23    144  |  111<H>  |  36<H>  ----------------------------<  120<H>  4.0   |  22  |  1.1    Ca    7.5<L>      23 Jan 2023 06:15  Mg     2.4     01-23    TPro  4.5<L>  /  Alb  2.5<L>  /  TBili  0.5  /  DBili  x   /  AST  104<H>  /  ALT  40  /  AlkPhos  69  01-23    CAPILLARY BLOOD GLUCOSE        LIVER FUNCTIONS - ( 23 Jan 2023 06:15 )  Alb: 2.5 g/dL / Pro: 4.5 g/dL / ALK PHOS: 69 U/L / ALT: 40 U/L / AST: 104 U/L / GGT: x           PTT - ( 23 Jan 2023 09:28 )  PTT:41.9 sec    Radiology  < from: CT Head No Cont (01.20.23 @ 08:50) >    IMPRESSION:  No acute intracranial pathology. No evidence of midline shift, mass   effect or intracranial hemorrhage.    --- End of Report ---      CHAUNCEY CLAIRE MD; Attending Radiologist  This document has been electronically signed. Jan 20 2023  9:39AM    < end of copied text >    < from: EEG (01.20.23 @ 16:00) >  Impression:  -  Abnormal due to the presence of: generalized slowing as above  -    Clinical Correlation & Recommendations  Consistent with diffuse cerebral electrophysiological dysfunction.    Secondary to toxic metabolic and possible underlying structural cause      Reviewed by:  Tutu Roque    Signed by:  Bushra    --- End of Report ---    TUTU ROQUE MD  This document has been electronically signed. Jan 21 2023  8:48AM    < end of copied text >

## 2023-01-23 NOTE — PROGRESS NOTE ADULT - SUBJECTIVE AND OBJECTIVE BOX
CT MEDINA  67y, Female  Allergy: No Known Allergies      LOS  3d    CHIEF COMPLAINT: cardiac arrest (21 Jan 2023 11:08)      INTERVAL EVENTS/HPI  - No acute events overnight  - T(F): , Max: 101.7 (01-22-23 @ 11:05)  - Febrile overnight   - WBC Count: 22.54 (01-23-23 @ 06:15)  WBC Count: 22.01 (01-22-23 @ 06:07)     - Creatinine, Serum: 1.1 (01-23-23 @ 06:15)  Creatinine, Serum: 1.3 (01-22-23 @ 06:07)       ROS  unable to obtain history secondary to patient's mental status and/or sedation    VITALS:  T(F): 99.9, Max: 101.7 (01-22-23 @ 11:05)  HR: 47  BP: 100/74  RR: 22Vital Signs Last 24 Hrs  T(C): 37.7 (23 Jan 2023 07:01), Max: 38.7 (22 Jan 2023 11:05)  T(F): 99.9 (23 Jan 2023 07:01), Max: 101.7 (22 Jan 2023 11:05)  HR: 47 (23 Jan 2023 06:00) (47 - 71)  BP: 100/74 (22 Jan 2023 12:00) (100/74 - 137/62)  BP(mean): 83 (22 Jan 2023 12:00) (83 - 89)  RR: 22 (23 Jan 2023 07:01) (22 - 25)  SpO2: 100% (23 Jan 2023 06:00) (99% - 100%)    Parameters below as of 22 Jan 2023 21:00  Patient On (Oxygen Delivery Method): ventilator        PHYSICAL EXAM:  Gen: intubated  HEENT: Normocephalic, atraumatic  Neck: supple, no lymphadenopathy  CV: Regular rate & regular rhythm  Lungs: decreased BS at bases, no fremitus  Abdomen: Soft, BS present  Ext: Warm, well perfused  Neuro: non focal, awake  Skin: no rash, nsedated  Lines: no phlebitis    FH: Non-contributory  Social Hx: Non-contributory    TESTS & MEASUREMENTS:                        10.7   22.54 )-----------( 162      ( 23 Jan 2023 06:15 )             33.1     01-23    144  |  111<H>  |  36<H>  ----------------------------<  120<H>  4.0   |  22  |  1.1    Ca    7.5<L>      23 Jan 2023 06:15  Mg     2.4     01-23    TPro  4.5<L>  /  Alb  2.5<L>  /  TBili  0.5  /  DBili  x   /  AST  104<H>  /  ALT  40  /  AlkPhos  69  01-23      LIVER FUNCTIONS - ( 23 Jan 2023 06:15 )  Alb: 2.5 g/dL / Pro: 4.5 g/dL / ALK PHOS: 69 U/L / ALT: 40 U/L / AST: 104 U/L / GGT: x               Culture - Aspirate with Gram Stain (collected 01-20-23 @ 16:46)  Source: .Aspirate Aspirate  Gram Stain (01-21-23 @ 14:19):    No polymorphonuclear leukocytes per low power field    No organisms seen per oil power field  Preliminary Report (01-22-23 @ 10:58):    No growth to date    Culture - Urine (collected 01-20-23 @ 16:45)  Source: Catheterized Catheterized  Final Report (01-22-23 @ 07:01):    No growth    Culture - Blood (collected 01-20-23 @ 15:19)  Source: .Blood None  Preliminary Report (01-21-23 @ 23:01):    No growth to date.    Culture - Blood (collected 01-20-23 @ 15:19)  Source: .Blood None  Preliminary Report (01-21-23 @ 23:01):    No growth to date.        Lactate, Blood: 2.7 mmol/L (01-20-23 @ 21:26)  Blood Gas Venous - Lactate: 7.90 mmol/L (01-20-23 @ 07:03)      INFECTIOUS DISEASES TESTING  Procalcitonin, Serum: 10.10 (01-21-23 @ 06:24)  Rapid RVP Result: Detected (01-20-23 @ 16:55)  MRSA PCR Result.: Negative (01-20-23 @ 16:55)  Legionella Antigen, Urine: Negative (01-20-23 @ 16:45)  Procalcitonin, Serum: 3.90 (01-20-23 @ 15:19)  COVID-19 PCR: NotDetec (01-20-23 @ 08:00)      INFLAMMATORY MARKERS      RADIOLOGY & ADDITIONAL TESTS:  I have personally reviewed the last available Chest xray  CXR  Xray Chest 1 View- PORTABLE-Urgent:   ACC: [id number] EXAM:  XR CHEST PORTABLE URGENT 1V   ORDERED BY: LOPEZ PERALTA     PROCEDURE DATE:  01/20/2023          INTERPRETATION:  Clinical History / Reason for exam: 67-year-old female   post endotracheal tube/orotracheal tube placement.    Comparison : Chest radiograph performed on/20/2023 at 7:22 AM.    Technique/Positioning: Portable, AP supine.  The patient is slightly   rotated right.    Findings:    Support devices: Precordial leads are present.  Endotracheal tube tip is   3.1 cm above the dave.  Enteric feeding tube courses along the midline,   below the diaphragms; the tip is excluded from the image.    Cardiac/mediastinum/hilum: The cardiac silhouette is magnified.    Lung parenchyma/Pleura: There are slightly decreased bilateral perihilar   pulmonary opacities.  No pleural effusion or pneumothorax is seen.    Skeleton/soft tissues: Stable thoracic spine degenerative changes.    Impression:  1.  Support lines/tubes, as described.  2.  Slightly decreased bilateral perihilar pulmonary opacities.        --- End of Report ---            VICKI JUNE MD; Attending Radiologist  This document has been electronically signed. Jan 21 2023 11:42AM (01-20-23 @ 18:07)      CT  CT Angio Chest PE Protocol w/ IV Cont:   ACC: [id number] EXAM:  CT ABDOMEN AND PELVIS IC   ORDERED BY: LISSETTE HAZEL     ACC: [id number] EXAM:  CT ANGIO CHEST PULM ART [facility]   ORDERED BY:   LISSETTE HAZEL     PROCEDURE DATE:  01/20/2023          INTERPRETATION:  CLINICAL STATEMENT: Hemoptysis    TECHNIQUE: Multislice helical sections were obtained from the thoracic   inlet to the lung bases during rapid administration of 100cc Optiray 320   intravenous contrast using a CTA protocol. Subsequently, axial CT   sections were obtained from the domes of the diaphragms to the pubic   symphysis. Thin sections were reconstructed through the pulmonary   vasculature. MIP reformats were added.    COMPARISON CT: None.    OTHER STUDIES USED FOR CORRELATION: None.      FINDINGS:    CHEST:    TUBES/LINES: Endotracheal tube projects into the right mainstem bronchus.   Recommend a 3 cm retraction. Enteric tube terminates in the stomach.   Right femoral vein catheter is in place. The tip appears to extend past   the lumen of the right common iliac vein (306/84). Clinical correlation   is advised.    PULMONARY EMBOLUS: No pulmonary embolus.    LUNGS/PLEURA/PERICARDIUM:: Trace right pleural effusion. Dense multifocal   airspace consolidation seen bilaterally centered at the lung bases and   apices suspicious for multifocal pneumonia. No pneumothorax.    CHEST WALL: Multiple right chest wall/breast intermediate density soft   tissue nodules measuring up to 3.6 x 3.5 cm. Adjacent fat stranding seen   in the right breast tissue. Subjacent mildly displaced rib fractures   involving the right anterior third fourth fifth sixth and seventh ribs.   Mildly displaced right anterior fourth, fifth, sixth, seventh rib   fractures.    MEDIASTINUM/THORACIC NODES: Unremarkable.      ABDOMEN/PELVIS:    HEPATOBILIARY: Bilobar hepatic cysts.    SPLEEN: Unremarkable.    PANCREAS: Unremarkable.    ADRENAL GLANDS: Unremarkable.    KIDNEYS: Unremarkable.    ABDOMINOPELVIC NODES: Unremarkable.    PELVIC ORGANS: Ray catheter is in place. Likely uterine fibroid with   mass effect on the endometrial stripe (306/103).    PERITONEUM/MESENTERY/BOWEL: Unremarkable.    BONES/SOFT TISSUES: Multilevel degenerative changes. Bilateral rib   fractures as detailed above.      IMPRESSION:    No evidence of a pulmonary embolism.    Endotracheal tube projects into the right mainstem bronchus. Recommend a   3 cm retraction.    Right femoral vein catheter is in place. The tip appears to extend past   the lumen of the right common iliac vein (306/84). Clinical correlation   is advised.    Dense multifocal airspace consolidation seen bilaterally centered at the   lung bases and apices suspicious for multifocal pneumonia. Trace right   pleural effusion.    Multiple right chest wall/breast intermediate density soft tissue nodules   measuring up to 3.6 x 3.5 cm. Adjacent fat stranding seen in the right   breast tissue. Differential includes posttraumatic changes with hematoma   formation versus underlying right breast malignancy.    Subjacent mildly displaced rib fractures involving the right anterior   third fourth fifth sixth and seventh ribs. Mildly displaced right   anterior fourth, fifth, sixth, seventh rib fractures.    --- End of Report ---            PEGGY PRAKASH MD; Attending Radiologist  This document has been electronically signed. Jan 20 2023 10:12AM (01-20-23 @ 09:24)      CARDIOLOGY TESTING  12 Lead ECG:   Systolic BP 85 mmHg    Diastolic BP 34 mmHg    Ventricular Rate 36 BPM    Atrial Rate 81 BPM    QRS Duration 144 ms    Q-T Interval 684 ms    QTC Calculation(Bazett) 528 ms    P Axis 15 degrees    R Axis -83 degrees    T Axis 86 degrees    Diagnosis Line Sinus  rhythm with third degree AV block with junctional escape rhythm.  Occassional PVC  Abnormal ECG    Confirmed by Ruddy Sher ([id number]) on 1/21/2023 10:31:27 AM (01-21-23 @ 03:12)  12 Lead ECG:   Ventricular Rate 59 BPM    Atrial Rate 60 BPM    QRS Duration 138 ms    Q-T Interval 592 ms    QTC Calculation(Bazett) 586 ms    P Axis 41 degrees    R Axis 100 degrees    T Axis 7 degrees    Diagnosis Line Sinus rhythm with A-V dissociation and Wide QRS rhythm  Right bundle branch block  Abnormal ECG    Confirmed by Ruddy Sher ([id number]) on 1/21/2023 10:44:39 AM (01-20-23 @ 18:44)      MEDICATIONS  aspirin  chewable 81 Oral daily  atorvastatin 40 Oral at bedtime  busPIRone 30 Oral every 8 hours  cefTRIAXone   IVPB     cefTRIAXone   IVPB 2000 IV Intermittent every 24 hours  chlorhexidine 4% Liquid 1 Topical <User Schedule>  clopidogrel Tablet 300 Oral once  clopidogrel Tablet 75 Oral daily  dexMEDEtomidine Infusion 0.2 IV Continuous <Continuous>  EPINEPHrine    Infusion 0.03 IV Continuous <Continuous>  fentaNYL   Infusion. 0.5 IV Continuous <Continuous>  heparin   Injectable 5000 IV Push once  heparin  Infusion.  IV Continuous <Continuous>  metroNIDAZOLE  IVPB 500 IV Intermittent every 8 hours  midazolam Infusion 0.02 IV Continuous <Continuous>  norepinephrine Infusion 0.05 IV Continuous <Continuous>  propofol Infusion 5 IV Continuous <Continuous>      WEIGHT  Weight (kg): 110 (01-20-23 @ 11:52)  Creatinine, Serum: 1.1 mg/dL (01-23-23 @ 06:15)      ANTIBIOTICS:  cefTRIAXone   IVPB      cefTRIAXone   IVPB 2000 milliGRAM(s) IV Intermittent every 24 hours  metroNIDAZOLE  IVPB 500 milliGRAM(s) IV Intermittent every 8 hours      All available historical records have been reviewed

## 2023-01-23 NOTE — PROGRESS NOTE ADULT - SUBJECTIVE AND OBJECTIVE BOX
Patient is a 67y old  Female who presents with a chief complaint of cardiac arrest (21 Jan 2023 11:08)        Over Night Events:        ROS:     All pertinent ROS are negative except HPI         PHYSICAL EXAM    ICU Vital Signs Last 24 Hrs  T(C): 37.7 (23 Jan 2023 07:01), Max: 38.7 (22 Jan 2023 11:05)  T(F): 99.9 (23 Jan 2023 07:01), Max: 101.7 (22 Jan 2023 11:05)  HR: 47 (23 Jan 2023 06:00) (47 - 71)  BP: 100/74 (22 Jan 2023 12:00) (100/74 - 137/62)  BP(mean): 83 (22 Jan 2023 12:00) (83 - 89)  ABP: 113/52 (23 Jan 2023 06:00) (86/45 - 159/74)  ABP(mean): 72 (23 Jan 2023 06:00) (60 - 101)  RR: 22 (23 Jan 2023 07:01) (22 - 25)  SpO2: 100% (23 Jan 2023 06:00) (99% - 100%)    O2 Parameters below as of 22 Jan 2023 21:00  Patient On (Oxygen Delivery Method): ventilator            CONSTITUTIONAL:   Ill appearing.  Well nourished.  NAD    ENT:   Airway patent,   Mouth with normal mucosa.   No thrush    EYES:   Pupils equal,   Round and reactive to light.    CARDIAC:   Normal rate,   Regular rhythm.    No edema    RESPIRATORY:   No wheezing  Bilateral BS  Normal chest expansion  Not tachypneic,  No use of accessory muscles    GASTROINTESTINAL:  Abdomen soft,   Non-tender,   No guarding,   + BS    GENITOURINARY  normal genitalia for sex  no edema    MUSCULOSKELETAL:   Range of motion is not limited,  No clubbing, cyanosis    NEUROLOGICAL:   Alert and oriented   No motor  deficits.  pertinent DTRs normal    SKIN:   Skin normal color for race,   Warm and dry  No evidence of rash.    PSYCHIATRIC:   No apparent risk to self or others.      01-22-23 @ 07:01  -  01-23-23 @ 07:00  --------------------------------------------------------  IN:    FentaNYL: 657.5 mL    Heparin Infusion: 274 mL    IV PiggyBack: 200 mL    Midazolam: 65.7 mL    Norepinephrine: 430 mL    Oral Fluid: 60 mL  Total IN: 1687.2 mL    OUT:    Indwelling Catheter - Urethral (mL): 580 mL  Total OUT: 580 mL    Total NET: 1107.2 mL      01-23-23 @ 07:01  -  01-23-23 @ 08:23  --------------------------------------------------------  IN:  Total IN: 0 mL    OUT:    Indwelling Catheter - Urethral (mL): 10 mL  Total OUT: 10 mL    Total NET: -10 mL          LABS:                            10.7   22.54 )-----------( 162      ( 23 Jan 2023 06:15 )             33.1                                               01-23    144  |  111<H>  |  36<H>  ----------------------------<  120<H>  4.0   |  22  |  1.1    Ca    7.5<L>      23 Jan 2023 06:15  Mg     2.4     01-23    TPro  4.5<L>  /  Alb  2.5<L>  /  TBili  0.5  /  DBili  x   /  AST  104<H>  /  ALT  40  /  AlkPhos  69  01-23      PTT - ( 23 Jan 2023 06:15 )  PTT:48.6 sec                                           CARDIAC MARKERS ( 23 Jan 2023 06:15 )  x     / 2.76 ng/mL / x     / x     / x      CARDIAC MARKERS ( 22 Jan 2023 06:07 )  x     / 4.32 ng/mL / x     / x     / x      CARDIAC MARKERS ( 21 Jan 2023 11:00 )  x     / 6.53 ng/mL / x     / x     / x                                                LIVER FUNCTIONS - ( 23 Jan 2023 06:15 )  Alb: 2.5 g/dL / Pro: 4.5 g/dL / ALK PHOS: 69 U/L / ALT: 40 U/L / AST: 104 U/L / GGT: x                                                  Culture - Aspirate with Gram Stain (collected 20 Jan 2023 16:46)  Source: .Aspirate Aspirate  Gram Stain (21 Jan 2023 14:19):    No polymorphonuclear leukocytes per low power field    No organisms seen per oil power field  Preliminary Report (22 Jan 2023 10:58):    No growth to date    Culture - Urine (collected 20 Jan 2023 16:45)  Source: Catheterized Catheterized  Final Report (22 Jan 2023 07:01):    No growth    Culture - Blood (collected 20 Jan 2023 15:19)  Source: .Blood None  Preliminary Report (21 Jan 2023 23:01):    No growth to date.    Culture - Blood (collected 20 Jan 2023 15:19)  Source: .Blood None  Preliminary Report (21 Jan 2023 23:01):    No growth to date.                                                   Mode: AC/ CMV (Assist Control/ Continuous Mandatory Ventilation)  RR (machine): 14  TV (machine): 380  FiO2: 30  PEEP: 8  MAP: 12  PIP: 24                                      ABG - ( 23 Jan 2023 03:03 )  pH, Arterial: 7.45  pH, Blood: x     /  pCO2: 30    /  pO2: 150   / HCO3: 21    / Base Excess: -2.3  /  SaO2: 98.0                MEDICATIONS  (STANDING):  aspirin  chewable 81 milliGRAM(s) Oral daily  atorvastatin 40 milliGRAM(s) Oral at bedtime  busPIRone 30 milliGRAM(s) Oral every 8 hours  cefTRIAXone   IVPB      cefTRIAXone   IVPB 2000 milliGRAM(s) IV Intermittent every 24 hours  chlorhexidine 4% Liquid 1 Application(s) Topical <User Schedule>  clopidogrel Tablet 300 milliGRAM(s) Oral once  clopidogrel Tablet 75 milliGRAM(s) Oral daily  dexMEDEtomidine Infusion 0.2 MICROgram(s)/kG/Hr (5.5 mL/Hr) IV Continuous <Continuous>  EPINEPHrine    Infusion 0.03 MICROgram(s)/kG/Min (6.19 mL/Hr) IV Continuous <Continuous>  fentaNYL   Infusion. 0.5 MICROgram(s)/kG/Hr (5.5 mL/Hr) IV Continuous <Continuous>  heparin   Injectable 5000 Unit(s) IV Push once  heparin  Infusion.  Unit(s)/Hr (10 mL/Hr) IV Continuous <Continuous>  metroNIDAZOLE  IVPB 500 milliGRAM(s) IV Intermittent every 8 hours  midazolam Infusion 0.02 mG/kG/Hr (2.2 mL/Hr) IV Continuous <Continuous>  norepinephrine Infusion 0.05 MICROgram(s)/kG/Min (5.16 mL/Hr) IV Continuous <Continuous>  propofol Infusion 5 MICROgram(s)/kG/Min (2.38 mL/Hr) IV Continuous <Continuous>    MEDICATIONS  (PRN):  heparin   Injectable 6000 Unit(s) IV Push every 6 hours PRN For aPTT less than 40      New X-rays reviewed:                                                                                  ECHO    CXR interpreted by me:       Patient is a 67y old  Female who presents with a chief complaint of cardiac arrest (21 Jan 2023 11:08)        Over Night Events:  Remains on mechanical ventilation.     Drips  Fentanyl- 2.7  Versed- 4 mg/hr  Heparin drip  Levophed- 0.19      ROS:     All pertinent ROS are negative except HPI         PHYSICAL EXAM    ICU Vital Signs Last 24 Hrs  T(C): 37.7 (23 Jan 2023 07:01), Max: 38.7 (22 Jan 2023 11:05)  T(F): 99.9 (23 Jan 2023 07:01), Max: 101.7 (22 Jan 2023 11:05)  HR: 47 (23 Jan 2023 06:00) (47 - 71)  BP: 100/74 (22 Jan 2023 12:00) (100/74 - 137/62)  BP(mean): 83 (22 Jan 2023 12:00) (83 - 89)  ABP: 113/52 (23 Jan 2023 06:00) (86/45 - 159/74)  ABP(mean): 72 (23 Jan 2023 06:00) (60 - 101)  RR: 22 (23 Jan 2023 07:01) (22 - 25)  SpO2: 100% (23 Jan 2023 06:00) (99% - 100%)    O2 Parameters below as of 22 Jan 2023 21:00  Patient On (Oxygen Delivery Method): ventilator        CONSTITUTIONAL:   Ill appearing. NAD    ENT:   Airway patent,   Mouth with normal mucosa.   No thrush    EYES:   Pupils equal,   Round and reactive to light.    CARDIAC:   Normal rate,   Regular rhythm.    No edema    RESPIRATORY:   ET+  Decreased bilateral BS  Normal chest expansion  Not tachypneic,  No use of accessory muscles    GASTROINTESTINAL:  Abdomen soft,   Non-tender,   No guarding,   + BS    GENITOURINARY  Ray  normal genitalia for sex  no edema    MUSCULOSKELETAL:   Range of motion is not limited,  No clubbing, cyanosis    NEUROLOGICAL:   +gag    SKIN:   Skin normal color for race,   Warm and dry  No evidence of rash.    PSYCHIATRIC:   No apparent risk to self or others.      01-22-23 @ 07:01  -  01-23-23 @ 07:00  --------------------------------------------------------  IN:    FentaNYL: 657.5 mL    Heparin Infusion: 274 mL    IV PiggyBack: 200 mL    Midazolam: 65.7 mL    Norepinephrine: 430 mL    Oral Fluid: 60 mL  Total IN: 1687.2 mL    OUT:    Indwelling Catheter - Urethral (mL): 580 mL  Total OUT: 580 mL    Total NET: 1107.2 mL      01-23-23 @ 07:01  -  01-23-23 @ 08:23  --------------------------------------------------------  IN:  Total IN: 0 mL    OUT:    Indwelling Catheter - Urethral (mL): 10 mL  Total OUT: 10 mL    Total NET: -10 mL        LABS:                            10.7   22.54 )-----------( 162      ( 23 Jan 2023 06:15 )             33.1                                               01-23    144  |  111<H>  |  36<H>  ----------------------------<  120<H>  4.0   |  22  |  1.1    Ca    7.5<L>      23 Jan 2023 06:15  Mg     2.4     01-23    TPro  4.5<L>  /  Alb  2.5<L>  /  TBili  0.5  /  DBili  x   /  AST  104<H>  /  ALT  40  /  AlkPhos  69  01-23      PTT - ( 23 Jan 2023 06:15 )  PTT:48.6 sec                                           CARDIAC MARKERS ( 23 Jan 2023 06:15 )  x     / 2.76 ng/mL / x     / x     / x      CARDIAC MARKERS ( 22 Jan 2023 06:07 )  x     / 4.32 ng/mL / x     / x     / x      CARDIAC MARKERS ( 21 Jan 2023 11:00 )  x     / 6.53 ng/mL / x     / x     / x                                                LIVER FUNCTIONS - ( 23 Jan 2023 06:15 )  Alb: 2.5 g/dL / Pro: 4.5 g/dL / ALK PHOS: 69 U/L / ALT: 40 U/L / AST: 104 U/L / GGT: x                                                  Culture - Aspirate with Gram Stain (collected 20 Jan 2023 16:46)  Source: .Aspirate Aspirate  Gram Stain (21 Jan 2023 14:19):    No polymorphonuclear leukocytes per low power field    No organisms seen per oil power field  Preliminary Report (22 Jan 2023 10:58):    No growth to date    Culture - Urine (collected 20 Jan 2023 16:45)  Source: Catheterized Catheterized  Final Report (22 Jan 2023 07:01):    No growth    Culture - Blood (collected 20 Jan 2023 15:19)  Source: .Blood None  Preliminary Report (21 Jan 2023 23:01):    No growth to date.    Culture - Blood (collected 20 Jan 2023 15:19)  Source: .Blood None  Preliminary Report (21 Jan 2023 23:01):    No growth to date.                                                   Mode: AC/ CMV (Assist Control/ Continuous Mandatory Ventilation)  RR (machine): 14  TV (machine): 380  FiO2: 30  PEEP: 8  MAP: 12  PIP: 24                                      ABG - ( 23 Jan 2023 03:03 )  pH, Arterial: 7.45  pH, Blood: x     /  pCO2: 30    /  pO2: 150   / HCO3: 21    / Base Excess: -2.3  /  SaO2: 98.0            MEDICATIONS  (STANDING):  aspirin  chewable 81 milliGRAM(s) Oral daily  atorvastatin 40 milliGRAM(s) Oral at bedtime  busPIRone 30 milliGRAM(s) Oral every 8 hours  cefTRIAXone   IVPB      cefTRIAXone   IVPB 2000 milliGRAM(s) IV Intermittent every 24 hours  chlorhexidine 4% Liquid 1 Application(s) Topical <User Schedule>  clopidogrel Tablet 300 milliGRAM(s) Oral once  clopidogrel Tablet 75 milliGRAM(s) Oral daily  dexMEDEtomidine Infusion 0.2 MICROgram(s)/kG/Hr (5.5 mL/Hr) IV Continuous <Continuous>  EPINEPHrine    Infusion 0.03 MICROgram(s)/kG/Min (6.19 mL/Hr) IV Continuous <Continuous>  fentaNYL   Infusion. 0.5 MICROgram(s)/kG/Hr (5.5 mL/Hr) IV Continuous <Continuous>  heparin   Injectable 5000 Unit(s) IV Push once  heparin  Infusion.  Unit(s)/Hr (10 mL/Hr) IV Continuous <Continuous>  metroNIDAZOLE  IVPB 500 milliGRAM(s) IV Intermittent every 8 hours  midazolam Infusion 0.02 mG/kG/Hr (2.2 mL/Hr) IV Continuous <Continuous>  norepinephrine Infusion 0.05 MICROgram(s)/kG/Min (5.16 mL/Hr) IV Continuous <Continuous>  propofol Infusion 5 MICROgram(s)/kG/Min (2.38 mL/Hr) IV Continuous <Continuous>    MEDICATIONS  (PRN):  heparin   Injectable 6000 Unit(s) IV Push every 6 hours PRN For aPTT less than 40      New X-rays reviewed:                                                                                  ECHO    CXR interpreted by me:  ZAMZAM JAMIL ok     Patient is a 67y old  Female who presents with a chief complaint of cardiac arrest (21 Jan 2023 11:08)        Over Night Events:  Remains on mechanical ventilation.   still on following drip   Drips  Fentanyl- 2.7  Versed- 4 mg/hr  Heparin drip  Levophed- 0.19      ROS:     All pertinent ROS are negative except HPI         PHYSICAL EXAM    ICU Vital Signs Last 24 Hrs  T(C): 37.7 (23 Jan 2023 07:01), Max: 38.7 (22 Jan 2023 11:05)  T(F): 99.9 (23 Jan 2023 07:01), Max: 101.7 (22 Jan 2023 11:05)  HR: 47 (23 Jan 2023 06:00) (47 - 71)  BP: 100/74 (22 Jan 2023 12:00) (100/74 - 137/62)  BP(mean): 83 (22 Jan 2023 12:00) (83 - 89)  ABP: 113/52 (23 Jan 2023 06:00) (86/45 - 159/74)  ABP(mean): 72 (23 Jan 2023 06:00) (60 - 101)  RR: 22 (23 Jan 2023 07:01) (22 - 25)  SpO2: 100% (23 Jan 2023 06:00) (99% - 100%)    O2 Parameters below as of 22 Jan 2023 21:00  Patient On (Oxygen Delivery Method): ventilator        CONSTITUTIONAL:   Ill appearing. NAD    ENT:   Airway patent,   Mouth with normal mucosa.   No thrush    EYES:   Pupils equal,   Round and reactive to light.    CARDIAC:   Normal rate,   Regular rhythm.    No edema    RESPIRATORY:   ET+  Decreased bilateral BS  Normal chest expansion  Not tachypneic,  No use of accessory muscles    GASTROINTESTINAL:  Abdomen soft,   Non-tender,   No guarding,   + BS    GENITOURINARY  Ray  normal genitalia for sex  no edema    MUSCULOSKELETAL:   Range of motion is not limited,  No clubbing, cyanosis    NEUROLOGICAL:   +gag    SKIN:   Skin normal color for race,   Warm and dry  No evidence of rash.    PSYCHIATRIC:   No apparent risk to self or others.      01-22-23 @ 07:01  -  01-23-23 @ 07:00  --------------------------------------------------------  IN:    FentaNYL: 657.5 mL    Heparin Infusion: 274 mL    IV PiggyBack: 200 mL    Midazolam: 65.7 mL    Norepinephrine: 430 mL    Oral Fluid: 60 mL  Total IN: 1687.2 mL    OUT:    Indwelling Catheter - Urethral (mL): 580 mL  Total OUT: 580 mL    Total NET: 1107.2 mL      01-23-23 @ 07:01  -  01-23-23 @ 08:23  --------------------------------------------------------  IN:  Total IN: 0 mL    OUT:    Indwelling Catheter - Urethral (mL): 10 mL  Total OUT: 10 mL    Total NET: -10 mL        LABS:                            10.7   22.54 )-----------( 162      ( 23 Jan 2023 06:15 )             33.1                                               01-23    144  |  111<H>  |  36<H>  ----------------------------<  120<H>  4.0   |  22  |  1.1    Ca    7.5<L>      23 Jan 2023 06:15  Mg     2.4     01-23    TPro  4.5<L>  /  Alb  2.5<L>  /  TBili  0.5  /  DBili  x   /  AST  104<H>  /  ALT  40  /  AlkPhos  69  01-23      PTT - ( 23 Jan 2023 06:15 )  PTT:48.6 sec                                           CARDIAC MARKERS ( 23 Jan 2023 06:15 )  x     / 2.76 ng/mL / x     / x     / x      CARDIAC MARKERS ( 22 Jan 2023 06:07 )  x     / 4.32 ng/mL / x     / x     / x      CARDIAC MARKERS ( 21 Jan 2023 11:00 )  x     / 6.53 ng/mL / x     / x     / x                                                LIVER FUNCTIONS - ( 23 Jan 2023 06:15 )  Alb: 2.5 g/dL / Pro: 4.5 g/dL / ALK PHOS: 69 U/L / ALT: 40 U/L / AST: 104 U/L / GGT: x                                                  Culture - Aspirate with Gram Stain (collected 20 Jan 2023 16:46)  Source: .Aspirate Aspirate  Gram Stain (21 Jan 2023 14:19):    No polymorphonuclear leukocytes per low power field    No organisms seen per oil power field  Preliminary Report (22 Jan 2023 10:58):    No growth to date    Culture - Urine (collected 20 Jan 2023 16:45)  Source: Catheterized Catheterized  Final Report (22 Jan 2023 07:01):    No growth    Culture - Blood (collected 20 Jan 2023 15:19)  Source: .Blood None  Preliminary Report (21 Jan 2023 23:01):    No growth to date.    Culture - Blood (collected 20 Jan 2023 15:19)  Source: .Blood None  Preliminary Report (21 Jan 2023 23:01):    No growth to date.                                                   Mode: AC/ CMV (Assist Control/ Continuous Mandatory Ventilation)  RR (machine): 14  TV (machine): 380  FiO2: 30  PEEP: 8  MAP: 12  PIP: 24                                      ABG - ( 23 Jan 2023 03:03 )  pH, Arterial: 7.45  pH, Blood: x     /  pCO2: 30    /  pO2: 150   / HCO3: 21    / Base Excess: -2.3  /  SaO2: 98.0            MEDICATIONS  (STANDING):  aspirin  chewable 81 milliGRAM(s) Oral daily  atorvastatin 40 milliGRAM(s) Oral at bedtime  busPIRone 30 milliGRAM(s) Oral every 8 hours  cefTRIAXone   IVPB      cefTRIAXone   IVPB 2000 milliGRAM(s) IV Intermittent every 24 hours  chlorhexidine 4% Liquid 1 Application(s) Topical <User Schedule>  clopidogrel Tablet 300 milliGRAM(s) Oral once  clopidogrel Tablet 75 milliGRAM(s) Oral daily  dexMEDEtomidine Infusion 0.2 MICROgram(s)/kG/Hr (5.5 mL/Hr) IV Continuous <Continuous>  EPINEPHrine    Infusion 0.03 MICROgram(s)/kG/Min (6.19 mL/Hr) IV Continuous <Continuous>  fentaNYL   Infusion. 0.5 MICROgram(s)/kG/Hr (5.5 mL/Hr) IV Continuous <Continuous>  heparin   Injectable 5000 Unit(s) IV Push once  heparin  Infusion.  Unit(s)/Hr (10 mL/Hr) IV Continuous <Continuous>  metroNIDAZOLE  IVPB 500 milliGRAM(s) IV Intermittent every 8 hours  midazolam Infusion 0.02 mG/kG/Hr (2.2 mL/Hr) IV Continuous <Continuous>  norepinephrine Infusion 0.05 MICROgram(s)/kG/Min (5.16 mL/Hr) IV Continuous <Continuous>  propofol Infusion 5 MICROgram(s)/kG/Min (2.38 mL/Hr) IV Continuous <Continuous>    MEDICATIONS  (PRN):  heparin   Injectable 6000 Unit(s) IV Push every 6 hours PRN For aPTT less than 40      New X-rays reviewed:                                                                                  ECHO    CXR interpreted by me:  ET, OG ok mild b/l opacity

## 2023-01-23 NOTE — DIETITIAN INITIAL EVALUATION ADULT - ORAL INTAKE PTA/DIET HISTORY
unable to assess pt intubated to vent    presently receiving jevity 1.2 300 ml q 6 hrs via OGT tolerating thus far

## 2023-01-23 NOTE — PROCEDURE NOTE - NSINDICATIONS_GEN_A_CORE
critical illness/emergency venous access/venous access/volume resuscitation
cardiac arrest/symptomatic bradycardia
arterial puncture to obtain ABG's/critical patient/monitoring purposes

## 2023-01-23 NOTE — PROGRESS NOTE ADULT - SUBJECTIVE AND OBJECTIVE BOX
SUBJECTIVE:    Patient is a 67y old Female who presents with a chief complaint of cardiac arrest (21 Jan 2023 11:08)    Currently admitted to medicine with the primary diagnosis of Cardiac arrest  Today is hospital day 3d. This morning she is resting comfortably in bed and reports no new issues or overnight events.     PAST MEDICAL & SURGICAL HISTORY  No pertinent past medical history    No significant past surgical history    ALLERGIES:  No Known Allergies    MEDICATIONS:  STANDING MEDICATIONS  aspirin  chewable 81 milliGRAM(s) Oral daily  atorvastatin 40 milliGRAM(s) Oral at bedtime  busPIRone 30 milliGRAM(s) Oral every 8 hours  chlorhexidine 4% Liquid 1 Application(s) Topical <User Schedule>  clopidogrel Tablet 300 milliGRAM(s) Oral once  clopidogrel Tablet 75 milliGRAM(s) Oral daily  fentaNYL   Infusion. 0.5 MICROgram(s)/kG/Hr IV Continuous <Continuous>  midazolam Infusion 0.02 mG/kG/Hr IV Continuous <Continuous>  norepinephrine Infusion 0.05 MICROgram(s)/kG/Min IV Continuous <Continuous>  piperacillin/tazobactam IVPB.. 3.375 Gram(s) IV Intermittent every 6 hours  propofol Infusion 5 MICROgram(s)/kG/Min IV Continuous <Continuous>  sodium chloride 0.9% Bolus 500 milliLiter(s) IV Bolus once    PRN MEDICATIONS    VITALS:   T(F): 101.8  HR: 68  BP: 119/51  RR: 25  SpO2: 99%    LABS:                        10.7   22.54 )-----------( 162      ( 23 Jan 2023 06:15 )             33.1     01-23    144  |  111<H>  |  36<H>  ----------------------------<  120<H>  4.0   |  22  |  1.1    Ca    7.5<L>      23 Jan 2023 06:15  Mg     2.4     01-23    TPro  4.5<L>  /  Alb  2.5<L>  /  TBili  0.5  /  DBili  x   /  AST  104<H>  /  ALT  40  /  AlkPhos  69  01-23    PTT - ( 23 Jan 2023 09:28 )  PTT:41.9 sec    ABG - ( 23 Jan 2023 12:11 )  pH, Arterial: 7.43  pH, Blood: x     /  pCO2: 35    /  pO2: 104   / HCO3: 23    / Base Excess: -0.6  /  SaO2: 99.0              Troponin T, Serum: 2.76 ng/mL *HH* (01-23-23 @ 06:15)      Culture - Aspirate with Gram Stain (collected 20 Jan 2023 16:46)  Source: .Aspirate Aspirate  Gram Stain (21 Jan 2023 14:19):    No polymorphonuclear leukocytes per low power field    No organisms seen per oil power field  Preliminary Report (23 Jan 2023 12:54):    Rare Streptococcus mitis/oralis group    Rare Streptococcus anginosus    "Susceptibilities not performed"    Culture - Urine (collected 20 Jan 2023 16:45)  Source: Catheterized Catheterized  Final Report (22 Jan 2023 07:01):    No growth      CARDIAC MARKERS ( 23 Jan 2023 06:15 )  x     / 2.76 ng/mL / x     / x     / x      CARDIAC MARKERS ( 22 Jan 2023 06:07 )  x     / 4.32 ng/mL / x     / x     / x        PHYSICAL EXAM:  GEN:  Ill appearing. NAD  LUNGS: Clear to auscultation bilaterally ,intubated,sedated  HEART: Normal rate, ; Regular rhythm; No edema  ABD: Soft, non-tender, non-distended.  EXT: NC/NC/NE/2+PP/CARLIN/Skin Intact.   NEURO: +gag      Indwelling Urethral Catheter:     Connect To:  Straight Drainage/Gravity    Indication:  Urine Output Monitoring in Critically Ill (01-20-23 @ 08:19) (not performed) [Active]

## 2023-01-23 NOTE — DIETITIAN INITIAL EVALUATION ADULT - ENTERAL
rec to change EN to peptamen AF at 20 ml/hr increase as tolerated to goal rate of 60ml/hr will provide pt with 1800 kcals, 114g protein meeting 100% of estimated nutrient needs

## 2023-01-23 NOTE — PROGRESS NOTE ADULT - ASSESSMENT
Patient sedated on pressors. Patient being cooled. Pacemaker repositioned. Threshold .8 MA. Set 60 bpm MA of 3.5. Check labs. Correct if needed. She needs a echo. Continue support. Prognosis poor

## 2023-01-23 NOTE — PROGRESS NOTE ADULT - ASSESSMENT
IMPRESSION:  Acute hypoxemic respiratory failure  cardio pulmonary arrest  x45min, 13 rounds of epi, 10 shocks  prolonged down time  Multifocal pneumonia possible aspiration   Acute respiratory distress syndrome  Sepsis present on admission  likely anoxic brain damage  enterovirus   PLAN:    CNS: EEG if no seizure do SAT to assess mental status   CT when stable   neurology eval      HEENT: Oral care    PULMONARY:  HOB @ 45 degrees.   increase rate to 25 decrease fio2 to 50%    CARDIOVASCULAR: ECHO. Trend CE. follow cardiology   keep IS =OS   taper pressors to map 65 try to taper off epi first   Avoid volume overload. BNP.  cardiac management    GI: GI prophylaxis. NPO check LA     RENAL:  Follow up lytes.  Correct as needed. Ray.     INFECTIOUS DISEASE: PanCx. Procal. Fungitell.   Nasal MRSA. DTA.   UA.   Serum galactomannan.   Urine strep/legionella. 2 sets of BCx. Full RVP.   abx as per ID   if spike again start vanco and switch rocephine to cefepime     HEMATOLOGICAL:  DVT prophylaxis. Dimer, LE venous duplex.     ENDOCRINE:  Follow up FS. Target -180. TSH.     MUSCULOSKELETAL: bedrest        MICU  very poor prognosis   DNR   IMPRESSION:  Acute hypoxemic respiratory failure  S/p Cardio pulmonary arrest  x45min, 13 rounds of epi, 10 shocks  prolonged down time  Episode of asystole and bradycardia on 01/21, s/p PPM placement, set at rate of 60  Multifocal pneumonia possible aspiration   Acute respiratory distress syndrome  Sepsis present on admission  likely anoxic brain damage  enterovirus     PLAN:    CNS: EEG negative for seizures. Repeat CT Head when stable. Neuro following. FU Neuron-specific enolase. SAT today to assess mental status.     HEENT: Oral care    PULMONARY:  HOB @ 45 degrees. Aspiration precautions Vent changes: dec RR to 18. Target Spo2 92-96%, dec FiO2 as tolerated.     CARDIOVASCULAR: ECHO wnl. Troponin downtrending. Cardio following.  Goal directed fluid resuscitation. 500cc LR bolus. CHEETAH.     GI: GI prophylaxis. Start OG feeds.     RENAL:  Follow up lytes.  Correct as needed. Ray.     INFECTIOUS DISEASE: Cultures negative to date. Procal elevated. FU Fungitell. Nasal MRSA negative. Urine strep/legionella negative. Start zosyn for now. DC rocephin and flagyl. ID FU.     HEMATOLOGICAL: LE venous duplex negative. FU cardio in regards to heparin drip duration. Trend cbc q12h.     ENDOCRINE:  Follow up FS. Target -180. FU TSH.     MUSCULOSKELETAL: bedrest    LINES:  R fem TLC, DC and place IJ TLC  L radial a-line    MICU  very poor prognosis   DNR

## 2023-01-23 NOTE — PROGRESS NOTE ADULT - ASSESSMENT
IMPRESSION:  Acute hypoxemic respiratory failure  S/p Cardio pulmonary arrest  x45min, 13 rounds of epi, 10 shocks  prolonged down time  Episode of asystole and bradycardia on 01/21, s/p PPM placement, set at rate of 60  Multifocal pneumonia possible aspiration   Acute respiratory distress syndrome  Sepsis present on admission  likely anoxic brain damage  enterovirus     PLAN:    CNS: EEG negative for seizures. MRI brain non contrast when stable as per Neurology. FU Neuron-specific enolase. SAT today to assess mental status.     HEENT: Oral care    PULMONARY:  HOB @ 45 degrees. Aspiration precautions Vent changes: dec RR to 18. Target Spo2 92-96%, dec FiO2 as tolerated.     CARDIOVASCULAR: ECHO wnl. Troponin downtrending. Cardio following.  Goal directed fluid resuscitation. 500cc LR bolus. CHEETAH.     GI: GI prophylaxis. Start OG feeds.     RENAL:  Follow up lytes.  Correct as needed. Ray.     INFECTIOUS DISEASE: Cultures negative to date. Procal elevated. FU Fungitell. Nasal MRSA negative. Urine strep/legionella negative. Start zosyn for now. DC rocephin and flagyl. ID FU.     HEMATOLOGICAL: LE venous duplex negative. FU cardio in regards to heparin drip duration. Trend cbc q12h.     ENDOCRINE:  Follow up FS. Target -180. FU TSH.     MUSCULOSKELETAL: bedrest    LINES:  R fem TLC, DC and place IJ TLC  L radial a-line    MICU  very poor prognosis   DNR    Family updated extensively, agreed to the plan.

## 2023-01-23 NOTE — DIETITIAN INITIAL EVALUATION ADULT - OTHER INFO
pt is 67 year old female with no pmhx found unresponsive by spouse with unknown downtime s/p ACLS by EMS s/p 10 shock with intubation in field. admitted with prolonged cardiac arrest, aspiration PNA with possible sepsis, multiple rib fx, suspected anoxic brain injury. 1/21 s/p code blue and insertion of transverse pacemaker. Pt made DNR.

## 2023-01-23 NOTE — PROGRESS NOTE ADULT - SUBJECTIVE AND OBJECTIVE BOX
Patient is a 67y old  Female who presents with a chief complaint of cardiac arrest (21 Jan 2023 11:08)      T(F): 99.9 (01-23-23 @ 07:01), Max: 101.7 (01-22-23 @ 11:05)  HR: 47 (01-23-23 @ 06:00)  BP: 100/74 (01-22-23 @ 12:00)  RR: 22 (01-23-23 @ 07:01)  SpO2: 100% (01-23-23 @ 06:00) (99% - 100%)    PHYSICAL EXAM:  GENERAL: NAD, well-groomed, well-developed  HEAD:  Atraumatic, Normocephalic  EYES: EOMI, PERRLA, conjunctiva and sclera clear  ENMT: No tonsillar erythema, exudates, or enlargement; Moist mucous membranes, Good dentition, No lesions  NECK: Supple, No JVD, Normal thyroid  NERVOUS SYSTEM:  Alert & Oriented X3,  Motor Strength 5/5 B/L upper and lower extremities  CHEST/LUNG: Clear to percussion bilaterally; No rales, rhonchi, wheezing, or rubs  HEART: Regular rate and rhythm; No murmurs, rubs, or gallops  ABDOMEN: Soft, Nontender, Nondistended; Bowel sounds present  EXTREMITIES:   No clubbing, cyanosis, or edema  LYMPH: No lymphadenopathy noted  SKIN: No rashes or lesions    labs  01-22    143  |  111<H>  |  35<H>  ----------------------------<  137<H>  4.0   |  20  |  1.3    Ca    7.7<L>      22 Jan 2023 06:07  Mg     2.4     01-22    TPro  4.7<L>  /  Alb  2.8<L>  /  TBili  0.5  /  DBili  x   /  AST  163<H>  /  ALT  58<H>  /  AlkPhos  62  01-22                          12.1   22.01 )-----------( 141      ( 22 Jan 2023 06:07 )             37.0       Culture - Aspirate with Gram Stain (collected 20 Jan 2023 16:46)  Source: .Aspirate Aspirate  Gram Stain (21 Jan 2023 14:19):    No polymorphonuclear leukocytes per low power field    No organisms seen per oil power field  Preliminary Report (22 Jan 2023 10:58):    No growth to date    Culture - Urine (collected 20 Jan 2023 16:45)  Source: Catheterized Catheterized  Final Report (22 Jan 2023 07:01):    No growth    Culture - Blood (collected 20 Jan 2023 15:19)  Source: .Blood None  Preliminary Report (21 Jan 2023 23:01):    No growth to date.    Culture - Blood (collected 20 Jan 2023 15:19)  Source: .Blood None  Preliminary Report (21 Jan 2023 23:01):    No growth to date.      PTT - ( 23 Jan 2023 01:24 )  PTT:43.0 sec  Mode: AC/ CMV (Assist Control/ Continuous Mandatory Ventilation)  RR (machine): 14  TV (machine): 380  FiO2: 30  PEEP: 8  MAP: 12  PIP: 24        aspirin  chewable 81 milliGRAM(s) Oral daily  atorvastatin 40 milliGRAM(s) Oral at bedtime  busPIRone 30 milliGRAM(s) Oral every 8 hours  cefTRIAXone   IVPB      cefTRIAXone   IVPB 2000 milliGRAM(s) IV Intermittent every 24 hours  chlorhexidine 4% Liquid 1 Application(s) Topical <User Schedule>  clopidogrel Tablet 300 milliGRAM(s) Oral once  clopidogrel Tablet 75 milliGRAM(s) Oral daily  dexMEDEtomidine Infusion 0.2 MICROgram(s)/kG/Hr IV Continuous <Continuous>  EPINEPHrine    Infusion 0.03 MICROgram(s)/kG/Min IV Continuous <Continuous>  fentaNYL   Infusion. 0.5 MICROgram(s)/kG/Hr IV Continuous <Continuous>  heparin   Injectable 5000 Unit(s) IV Push once  heparin   Injectable 6000 Unit(s) IV Push every 6 hours PRN  heparin  Infusion.  Unit(s)/Hr IV Continuous <Continuous>  metroNIDAZOLE  IVPB 500 milliGRAM(s) IV Intermittent every 8 hours  midazolam Infusion 0.02 mG/kG/Hr IV Continuous <Continuous>  norepinephrine Infusion 0.05 MICROgram(s)/kG/Min IV Continuous <Continuous>  propofol Infusion 5 MICROgram(s)/kG/Min IV Continuous <Continuous>

## 2023-01-23 NOTE — DIETITIAN INITIAL EVALUATION ADULT - PERTINENT LABORATORY DATA
01-23    144  |  111<H>  |  36<H>  ----------------------------<  120<H>  4.0   |  22  |  1.1    Ca    7.5<L>      23 Jan 2023 06:15  Mg     2.4     01-23    TPro  4.5<L>  /  Alb  2.5<L>  /  TBili  0.5  /  DBili  x   /  AST  104<H>  /  ALT  40  /  AlkPhos  69  01-23  A1C with Estimated Average Glucose Result: 5.2 % (01-21-23 @ 06:24)

## 2023-01-23 NOTE — PROGRESS NOTE ADULT - ASSESSMENT
A 66 y/o  F with no past medical history (does not see doctors) was found by   unresponsive - called EMS who found the pt pulseless and coded pt ~45min s/p 10 shocks then ROSC.  Pt intubated , sedated  on midodrine, fentanyl and pressors and heparin drip. On exam pt with gag reflex, corneal reflex positive, pinpoint pupils sluggishly reactive,   frowns to painful stimulus on B/l LE L>R. EEG no seizure. CT head negative. PT with anoxic brain injury.     Plan    -follow up neuron specific enolase   -neurology to follow up      A 68 y/o  F with no past medical history (does not see doctors) was found by   unresponsive - called EMS who found the pt pulseless and coded pt ~45min s/p 10 shocks then ROSC.  Pt intubated , sedated  on midodrine, fentanyl and pressors and heparin drip. On exam pt with gag reflex, corneal reflex positive, pinpoint pupils sluggishly reactive,   frowns to painful stimulus on B/l LE L>R. EEG no seizure. CT head negative. PT with anoxic brain injury.     Plan  -MRI non contrast when pt stable   -follow up neuron specific enolase   -neurology to follow up

## 2023-01-23 NOTE — DIETITIAN INITIAL EVALUATION ADULT - NS FNS DIET ORDER
Diet, NPO with Tube Feed:   Tube Feeding Modality: Orogastric  Jevity 1.2 Balbir  Bolus  Total Volume of Bolus (mL):  330  Total # of Feeds: 4  Tube Feed Frequency: Every 6 hours   Tube Feed Start Time: 12:00  Bolus Feed Rate (mL per Hour): 55   Bolus Feed Duration (in Hours): 24  Free Water Flush  Bolus   Total Volume per Flush (mL): 300   Frequency: Every 6 Hours  Free Water Flush Instructions:  50 ml pre and post feeds (01-23-23 @ 11:12)

## 2023-01-23 NOTE — PROGRESS NOTE ADULT - ASSESSMENT
ASSESSMENT  67-year-old female no known medical history brought in by EMS as postcardiac arrest.    IMPRESSION  #Cardiac Arrest with prolonged CPR   #Hypoxic Respiratory Failure  #Multifocal Pneumonia/Aspiration Pneumonia   - CT Angio Chest PE Protocol w/ IV Cont (01.20.23 @ 09:24): Dense multifocal airspace consolidation seen bilaterally centered at the   lung bases and apices suspicious for multifocal pneumonia. Trace right  pleural effusion  - sputum cx 1/21 NG   - MRSA PCR Result.: Negative: By: Real-Time PCR (Polymerase Reaction Method) (01.20.23 @ 16:55)    #Enterovirus/Rhinovirus Respiratory tract infection     #Leukocytosis with Lymphocytosis   #Transaminitis  #Obesity BMI (kg/m2): 44.3  #Abx allergy: NKDA      RECOMMENDATIONS  This is a preliminary incomplete pended note, all final recommendations to follow after interview and examination of the patient.      Please call or message on [employer] Teams if with any questions.  Spectra [phone]   ASSESSMENT  67-year-old female no known medical history brought in by EMS as postcardiac arrest.    IMPRESSION  #Cardiac Arrest with prolonged CPR   #Hypoxic Respiratory Failure  #Multifocal Pneumonia/Aspiration Pneumonia   - CT Angio Chest PE Protocol w/ IV Cont (01.20.23 @ 09:24): Dense multifocal airspace consolidation seen bilaterally centered at the   lung bases and apices suspicious for multifocal pneumonia. Trace right  pleural effusion  - sputum cx 1/21 NG   - MRSA PCR Result.: Negative: By: Real-Time PCR (Polymerase Reaction Method) (01.20.23 @ 16:55)    #Enterovirus/Rhinovirus Respiratory tract infection     #Leukocytosis with Lymphocytosis   #Transaminitis  #Obesity BMI (kg/m2): 44.3  #Abx allergy: NKDA      RECOMMENDATIONS  - trend WBC -- continue ceftriaxone 2g daily and flagyl 500 mg TID   - fevers multifactorial - cultures have been negative so far   - serial neuro exams   - trend fever curve     Please call or message on [employer] Teams if with any questions.  Spectra [phone]

## 2023-01-23 NOTE — DIETITIAN INITIAL EVALUATION ADULT - PERTINENT MEDS FT
MEDICATIONS  (STANDING):  aspirin  chewable 81 milliGRAM(s) Oral daily  atorvastatin 40 milliGRAM(s) Oral at bedtime  busPIRone 30 milliGRAM(s) Oral every 8 hours  chlorhexidine 4% Liquid 1 Application(s) Topical <User Schedule>  clopidogrel Tablet 300 milliGRAM(s) Oral once  clopidogrel Tablet 75 milliGRAM(s) Oral daily  fentaNYL   Infusion. 0.5 MICROgram(s)/kG/Hr (5.5 mL/Hr) IV Continuous <Continuous>  midazolam Infusion 0.02 mG/kG/Hr (2.2 mL/Hr) IV Continuous <Continuous>  norepinephrine Infusion 0.05 MICROgram(s)/kG/Min (5.16 mL/Hr) IV Continuous <Continuous>  piperacillin/tazobactam IVPB.. 3.375 Gram(s) IV Intermittent every 6 hours  propofol Infusion 5 MICROgram(s)/kG/Min (2.38 mL/Hr) IV Continuous <Continuous>    MEDICATIONS  (PRN):

## 2023-01-23 NOTE — PROGRESS NOTE ADULT - SUBJECTIVE AND OBJECTIVE BOX
Patient seen and evaluated thia am, sedated on ventilator with fentanyl and versed      T(F): 99.9 (01-23-23 @ 07:01), Max: 101.7 (01-22-23 @ 11:05)  HR: 47 (01-23-23 @ 06:00)  BP: 100/74 (01-22-23 @ 12:00)  RR: 22 (01-23-23 @ 07:01)  SpO2: 100% (01-23-23 @ 06:00) (99% - 100%)    PHYSICAL EXAM:  GENERAL: NAD  HEAD:  Atraumatic, Normocephalic  EYES: EOMI, PERRLA, conjunctiva and sclera clear  NERVOUS SYSTEM: positive gag   CHEST/LUNG:  bilateral rhonchi  HEART: Regular rate and rhythm; No murmurs, rubs, or gallops  ABDOMEN: Soft, Nontender, Nondistended; Bowel sounds present  EXTREMITIES:  2+ Peripheral Pulses, No clubbing, cyanosis, or edema    LABS  01-23    144  |  111<H>  |  36<H>  ----------------------------<  120<H>  4.0   |  22  |  1.1    Ca    7.5<L>      23 Jan 2023 06:15  Mg     2.4     01-23    TPro  4.5<L>  /  Alb  2.5<L>  /  TBili  0.5  /  DBili  x   /  AST  104<H>  /  ALT  40  /  AlkPhos  69  01-23                          10.7   22.54 )-----------( 162      ( 23 Jan 2023 06:15 )             33.1     PTT - ( 23 Jan 2023 06:15 )  PTT:48.6 sec    Mode: AC/ CMV (Assist Control/ Continuous Mandatory Ventilation)  RR (machine): 14  TV (machine): 380  FiO2: 30  PEEP: 8    CARDIAC ENZYMES  Creatine Kinase, Serum: 2498 (01-21 @ 06:24)    CKMB Units: 224.5 (01-21 @ 06:24)    Troponin T, Serum: 2.76 ng/mL (01-23-23 @ 06:15)  Troponin T, Serum: 4.32 ng/mL (01-22-23 @ 06:07)  Troponin T, Serum: 6.53 ng/mL (01-21-23 @ 11:00)  Troponin T, Serum: 10.53 ng/mL (01-21-23 @ 06:24)  Troponin T, Serum: 10.89 ng/mL (01-20-23 @ 22:00)  Troponin T, Serum: 9.45 ng/mL (01-20-23 @ 15:19)    < from: TTE Echo Complete w/o Contrast w/ Doppler (01.20.23 @ 14:39) >  Summary:   1. Normal left atrial size.   2. There is no evidence of pericardial effusion.   3. Mild mitral annular calcification.   4. Mild mitral valve regurgitation.   5. There is mild aortic root calcification.    PHYSICIAN INTERPRETATION:  Left Atrium: Normal left atrial size.  Pericardium: There is no evidence of pericardial effusion.  Mitral Valve: There is mild mitral annular calcification. Mild mitral   valve regurgitation is seen.  Aorta: There is mild aortic root calcification.      2D AND M-MODE MEASUREMENTS (normal ranges within parentheses):  Left                  Normal  Aorta/Left             Normal  Ventricle:                     Atrium:  IVSd (2D):  1.00 cm  (0.7-1.1) AoV Cusp       1.41  (1.5-2.6)  LVPWd (2D): 1.05 cm  (0.7-1.1) Separation:     cm  LVIDd (2D): 3.80 cm  (3.4-5.7) Left Atrium    3.33  (1.9-4.0)  LVIDs (2D): 3.19 cm            (2D):           cm  LV FS (2D):  16.1 %   (>25%)   Left Atrium    3.48  (1.9-4.0)  Relative      0.55    (<0.42)  (Mmode):        cm  Wall                           LA Volume      17.7  Thickness                      Index         ml/m²    SPECTRAL DOPPLER ANALYSIS:  LV DIASTOLIC FUNCTION:  MV Peak E: 0.33 m/s Decel Time: 213 msec  MV Peak A: 0.85 m/s  E/A Ratio: 0.39    Aortic Valve:  AoV VMax:    2.55 m/s  AoV Area, Vmax: 1.08 cm² Vmax Indx: 0.52 cm²/m²  AoV VTI:  0.46 m    AoV Area, VTI:  1.04 cm² VTI Indx:  0.50 cm²/m²  AoV Pk Grad: 26.0 mmHg  AoV Mn Grad: 13.9 mmHg    LVOT Vmax: 0.92 m/s  LVOT VTI:  0.16 m  LVOT Diam: 1.95 cm    Mitral Valve:  MV VMax:    1.14 m/s MV P1/2 Time: 61.85 msec  MV Mn Grad: 1.1mmHg MV Area, PHT: 3.56 cm²    Tricuspid Valve and PA/RV Systolic Pressure: TR Max Velocity: 2.25 m/s RA   Pressure:  RVSP/PASP:    < end of copied text >    Culture Results:   No growth to date (01-20-23)  Culture Results:   No growth (01-20-23)  Culture Results:   No growth to date. (01-20-23)  Culture Results:   No growth to date. (01-20-23)    RADIOLOGY  < from: Xray Chest 1 View- PORTABLE-Routine (Xray Chest 1 View- PORTABLE-Routine in AM.) (01.22.23 @ 05:59) >    Impression:  1.  Support lines/tubes, as described.  2.  Previously reported bilateral perihilar pulmonary opacities are not   well evaluated due to rotated cardiomediastinal silhouette.  Evaluation   also limited by material external to the patient.    < end of copied text >    MEDICATIONS  (STANDING):  aspirin  chewable 81 milliGRAM(s) Oral daily  atorvastatin 40 milliGRAM(s) Oral at bedtime  busPIRone 30 milliGRAM(s) Oral every 8 hours   cefTRIAXone   IVPB 2000 milliGRAM(s) IV Intermittent every 24 hours  chlorhexidine 4% Liquid 1 Application(s) Topical <User Schedule>  clopidogrel Tablet 300 milliGRAM(s) Oral once  clopidogrel Tablet 75 milliGRAM(s) Oral daily  dexMEDEtomidine Infusion 0.2 MICROgram(s)/kG/Hr (5.5 mL/Hr) IV Continuous <Continuous>  EPINEPHrine    Infusion 0.03 MICROgram(s)/kG/Min (6.19 mL/Hr) IV Continuous <Continuous>  fentaNYL   Infusion. 0.5 MICROgram(s)/kG/Hr (5.5 mL/Hr) IV Continuous <Continuous>  heparin   Injectable 5000 Unit(s) IV Push once  heparin  Infusion.  Unit(s)/Hr (10 mL/Hr) IV Continuous <Continuous>  metroNIDAZOLE  IVPB 500 milliGRAM(s) IV Intermittent every 8 hours  midazolam Infusion 0.02 mG/kG/Hr (2.2 mL/Hr) IV Continuous <Continuous>  norepinephrine Infusion 0.05 MICROgram(s)/kG/Min (5.16 mL/Hr) IV Continuous <Continuous>  propofol Infusion 5 MICROgram(s)/kG/Min (2.38 mL/Hr) IV Continuous <Continuous>    MEDICATIONS  (PRN):  heparin   Injectable 6000 Unit(s) IV Push every 6 hours PRN For aPTT less than 40

## 2023-01-23 NOTE — PROGRESS NOTE ADULT - CRITICAL CARE ATTENDING COMMENT
I have personally seen and examined this patient.  I have fully participated in the care of this patient.  I have reviewed all pertinent clinical information, including history, physical exam, plan and note.  Patient s/p cardiac arrest. Off from sedation since this morning. Exam shows pinpoint pupils and intact cornea. Recommend Brain MRI to asses for anoxic brain injury. VEEG today. Will follow.  I have reviewed all pertinent clinical information and reviewed all relevant imaging and diagnostic studies personally.  Recommendations as above.  Agree with above assessment except as noted.

## 2023-01-24 NOTE — PROGRESS NOTE ADULT - SUBJECTIVE AND OBJECTIVE BOX
SUBJECTIVE:    Patient is a 67y old Female who presents with a chief complaint of CARDIAC ARREST ELEVATED TROPONIN     (23 Jan 2023 19:42)    Currently admitted to medicine with the primary diagnosis of Cardiac arrest       Today is hospital day 4d. This morning she is resting  in bed. off sedation since 1/23. positive gag cough reflex      overnight events. pt was bradycardic to HR  30-40's. Pacer was not capturing HR. Cardiology was seeing pt in am, adjusted pacemaker.   Pt had episode of vomiting in AM, OG feeds stopped.    PAST MEDICAL & SURGICAL HISTORY  No pertinent past medical history    No significant past surgical history      ALLERGIES:  No Known Allergies    MEDICATIONS:  STANDING MEDICATIONS  aspirin  chewable 81 milliGRAM(s) Oral daily  atorvastatin 40 milliGRAM(s) Oral at bedtime  busPIRone 30 milliGRAM(s) Oral every 8 hours  chlorhexidine 2% Cloths 1 Application(s) Topical <User Schedule>  chlorhexidine 4% Liquid 1 Application(s) Topical <User Schedule>  clopidogrel Tablet 300 milliGRAM(s) Oral once  clopidogrel Tablet 75 milliGRAM(s) Oral daily  fentaNYL   Infusion. 0.5 MICROgram(s)/kG/Hr IV Continuous <Continuous>  midazolam Infusion 0.02 mG/kG/Hr IV Continuous <Continuous>  norepinephrine Infusion 0.05 MICROgram(s)/kG/Min IV Continuous <Continuous>  piperacillin/tazobactam IVPB.. 3.375 Gram(s) IV Intermittent every 6 hours  propofol Infusion 5 MICROgram(s)/kG/Min IV Continuous <Continuous>    PRN MEDICATIONS  sodium chloride 0.9% lock flush 10 milliLiter(s) IV Push every 1 hour PRN    VITALS:   T(F): 99.3  HR: 57  BP: 92/50  RR: 41  SpO2: 96%    LABS:                        9.3    19.98 )-----------( 174      ( 24 Jan 2023 06:17 )             29.0     01-24    143  |  108  |  46<H>  ----------------------------<  143<H>  4.1   |  24  |  1.2    Ca    7.9<L>      24 Jan 2023 06:17  Mg     2.5     01-24    TPro  4.5<L>  /  Alb  2.6<L>  /  TBili  0.6  /  DBili  x   /  AST  73<H>  /  ALT  29  /  AlkPhos  74  01-24    PTT - ( 23 Jan 2023 09:28 )  PTT:41.9 sec    ABG - ( 24 Jan 2023 03:11 )  pH, Arterial: 7.45  pH, Blood: x     /  pCO2: 31    /  pO2: 206   / HCO3: 22    / Base Excess: -1.9  /  SaO2: 99.4                  Culture - Urine (collected 22 Jan 2023 14:25)  Source: Catheterized Catheterized  Final Report (24 Jan 2023 05:34):    No growth    Culture - Blood (collected 22 Jan 2023 11:09)  Source: .Blood None  Preliminary Report (23 Jan 2023 22:02):    No growth to date.      CARDIAC MARKERS ( 23 Jan 2023 06:15 )  x     / 2.76 ng/mL / x     / x     / x        PHYSICAL EXAM:  GEN:  Ill appearing. NAD  LUNGS: Clear to auscultation bilaterally ,intubated, off sedation  HEART: Normal rate, ; Regular rhythm; No edema  ABD: Soft, non-tender, non-distended.  EXT: NC/NC/NE/2+PP/CARLIN/Skin Intact.   NEURO: +gag      Intravenous access:   NG tube:   Ray Catheter:   Indwelling Urethral Catheter:     Connect To:  Straight Drainage/Gravity    Indication:  Urine Output Monitoring in Critically Ill (01-20-23 @ 08:19) (not performed) [Active]

## 2023-01-24 NOTE — PROGRESS NOTE ADULT - SUBJECTIVE AND OBJECTIVE BOX
Patient is a 67y old  Female who presents with a chief complaint of CARDIAC ARREST ELEVATED TROPONIN     (23 Jan 2023 19:42)        Over Night Events:        ROS:     All pertinent ROS are negative except HPI         PHYSICAL EXAM    ICU Vital Signs Last 24 Hrs  T(C): 37.4 (24 Jan 2023 07:01), Max: 38.8 (23 Jan 2023 12:00)  T(F): 99.3 (24 Jan 2023 07:01), Max: 101.8 (23 Jan 2023 12:00)  HR: 51 (24 Jan 2023 07:30) (46 - 86)  BP: 92/50 (24 Jan 2023 01:15) (92/50 - 124/67)  BP(mean): 59 (24 Jan 2023 01:15) (59 - 86)  ABP: 97/37 (24 Jan 2023 07:30) (92/36 - 159/67)  ABP(mean): 56 (24 Jan 2023 07:30) (53 - 95)  RR: 25 (24 Jan 2023 07:30) (18 - 32)  SpO2: 99% (24 Jan 2023 07:30) (52% - 100%)    O2 Parameters below as of 24 Jan 2023 07:00  Patient On (Oxygen Delivery Method): ventilator    O2 Concentration (%): 70        CONSTITUTIONAL:   Ill appearing.  Well nourished.  NAD    ENT:   Airway patent,   Mouth with normal mucosa.   No thrush    EYES:   Pupils equal,   Round and reactive to light.    CARDIAC:   Normal rate,   Regular rhythm.    No edema    RESPIRATORY:   No wheezing  Bilateral BS  Normal chest expansion  Not tachypneic,  No use of accessory muscles    GASTROINTESTINAL:  Abdomen soft,   Non-tender,   No guarding,   + BS    GENITOURINARY  normal genitalia for sex  no edema    MUSCULOSKELETAL:   Range of motion is not limited,  No clubbing, cyanosis    NEUROLOGICAL:   Alert and oriented   No motor  deficits.  pertinent DTRs normal    SKIN:   Skin normal color for race,   Warm and dry  No evidence of rash.    PSYCHIATRIC:   No apparent risk to self or others.        01-23-23 @ 07:01  -  01-24-23 @ 07:00  --------------------------------------------------------  IN:    Free Water: 450 mL    Heparin Infusion: 42 mL    IV PiggyBack: 100 mL    Jevity 1.2: 495 mL    Lactated Ringers Bolus: 500 mL    Midazolam: 8 mL    Norepinephrine: 179.8 mL    Peptamen A.F.: 380 mL    Sodium Chloride 0.9% Bolus: 250 mL  Total IN: 2404.8 mL    OUT:    Dexmedetomidine: 0 mL    EPINEPHrine: 0 mL    FentaNYL: 0 mL    Indwelling Catheter - Urethral (mL): 575 mL    Propofol: 0 mL  Total OUT: 575 mL    Total NET: 1829.8 mL      01-24-23 @ 07:01  -  01-24-23 @ 08:21  --------------------------------------------------------  IN:    Norepinephrine: 8 mL  Total IN: 8 mL    OUT:    Indwelling Catheter - Urethral (mL): 10 mL  Total OUT: 10 mL    Total NET: -2 mL          LABS:                            9.3    19.98 )-----------( 174      ( 24 Jan 2023 06:17 )             29.0                                               01-24    143  |  108  |  46<H>  ----------------------------<  143<H>  4.1   |  24  |  1.2    Ca    7.9<L>      24 Jan 2023 06:17  Mg     2.5     01-24    TPro  4.5<L>  /  Alb  2.6<L>  /  TBili  0.6  /  DBili  x   /  AST  73<H>  /  ALT  29  /  AlkPhos  74  01-24      PTT - ( 23 Jan 2023 09:28 )  PTT:41.9 sec                                           CARDIAC MARKERS ( 23 Jan 2023 06:15 )  x     / 2.76 ng/mL / x     / x     / x                                                LIVER FUNCTIONS - ( 24 Jan 2023 06:17 )  Alb: 2.6 g/dL / Pro: 4.5 g/dL / ALK PHOS: 74 U/L / ALT: 29 U/L / AST: 73 U/L / GGT: x                                                  Culture - Urine (collected 22 Jan 2023 14:25)  Source: Catheterized Catheterized  Final Report (24 Jan 2023 05:34):    No growth    Culture - Blood (collected 22 Jan 2023 11:09)  Source: .Blood None  Preliminary Report (23 Jan 2023 22:02):    No growth to date.                                                   Mode: AC/ CMV (Assist Control/ Continuous Mandatory Ventilation)  RR (machine): 20  TV (machine): 400  FiO2: 70  PEEP: 8  MAP: 13  PIP: 24                                      ABG - ( 24 Jan 2023 03:11 )  pH, Arterial: 7.45  pH, Blood: x     /  pCO2: 31    /  pO2: 206   / HCO3: 22    / Base Excess: -1.9  /  SaO2: 99.4                MEDICATIONS  (STANDING):  aspirin  chewable 81 milliGRAM(s) Oral daily  atorvastatin 40 milliGRAM(s) Oral at bedtime  busPIRone 30 milliGRAM(s) Oral every 8 hours  chlorhexidine 2% Cloths 1 Application(s) Topical <User Schedule>  chlorhexidine 4% Liquid 1 Application(s) Topical <User Schedule>  clopidogrel Tablet 300 milliGRAM(s) Oral once  clopidogrel Tablet 75 milliGRAM(s) Oral daily  fentaNYL   Infusion. 0.5 MICROgram(s)/kG/Hr (5.5 mL/Hr) IV Continuous <Continuous>  midazolam Infusion 0.02 mG/kG/Hr (2.2 mL/Hr) IV Continuous <Continuous>  norepinephrine Infusion 0.05 MICROgram(s)/kG/Min (5.16 mL/Hr) IV Continuous <Continuous>  piperacillin/tazobactam IVPB.. 3.375 Gram(s) IV Intermittent every 6 hours  propofol Infusion 5 MICROgram(s)/kG/Min (2.38 mL/Hr) IV Continuous <Continuous>    MEDICATIONS  (PRN):  sodium chloride 0.9% lock flush 10 milliLiter(s) IV Push every 1 hour PRN Pre/post blood products, medications, blood draw, and to maintain line patency      New X-rays reviewed:                                                                                  ECHO    CXR interpreted by me:       Patient is a 67y old  Female who presents with a chief complaint of CARDIAC ARREST ELEVATED TROPONIN     (23 Jan 2023 19:42)        Over Night Events:  off sedation since yesterday not awake   positive gag cough reflex       ROS:     All pertinent ROS are negative except HPI         PHYSICAL EXAM    ICU Vital Signs Last 24 Hrs  T(C): 37.4 (24 Jan 2023 07:01), Max: 38.8 (23 Jan 2023 12:00)  T(F): 99.3 (24 Jan 2023 07:01), Max: 101.8 (23 Jan 2023 12:00)  HR: 51 (24 Jan 2023 07:30) (46 - 86)  BP: 92/50 (24 Jan 2023 01:15) (92/50 - 124/67)  BP(mean): 59 (24 Jan 2023 01:15) (59 - 86)  ABP: 97/37 (24 Jan 2023 07:30) (92/36 - 159/67)  ABP(mean): 56 (24 Jan 2023 07:30) (53 - 95)  RR: 25 (24 Jan 2023 07:30) (18 - 32)  SpO2: 99% (24 Jan 2023 07:30) (52% - 100%)    O2 Parameters below as of 24 Jan 2023 07:00  Patient On (Oxygen Delivery Method): ventilator    O2 Concentration (%): 70        CONSTITUTIONAL:   Ill appearing.  Well nourished.  NAD    ENT:   Airway patent,   Mouth with normal mucosa.   No thrush    EYES:   Pupils equal,   Round and reactive to light.    CARDIAC:   Normal rate,   Regular rhythm.    No edema    RESPIRATORY:   No wheezing  Bilateral BS  Normal chest expansion  Not tachypneic,  No use of accessory muscles    GASTROINTESTINAL:  Abdomen soft,   Non-tender,   No guarding,   + BS    GENITOURINARY  normal genitalia for sex  no edema    MUSCULOSKELETAL:   Range of motion is not limited,  No clubbing, cyanosis    NEUROLOGICAL:   Alert and oriented   No motor  deficits.  pertinent DTRs normal    SKIN:   Skin normal color for race,   Warm and dry  No evidence of rash.    PSYCHIATRIC:   No apparent risk to self or others.        01-23-23 @ 07:01  -  01-24-23 @ 07:00  --------------------------------------------------------  IN:    Free Water: 450 mL    Heparin Infusion: 42 mL    IV PiggyBack: 100 mL    Jevity 1.2: 495 mL    Lactated Ringers Bolus: 500 mL    Midazolam: 8 mL    Norepinephrine: 179.8 mL    Peptamen A.F.: 380 mL    Sodium Chloride 0.9% Bolus: 250 mL  Total IN: 2404.8 mL    OUT:    Dexmedetomidine: 0 mL    EPINEPHrine: 0 mL    FentaNYL: 0 mL    Indwelling Catheter - Urethral (mL): 575 mL    Propofol: 0 mL  Total OUT: 575 mL    Total NET: 1829.8 mL      01-24-23 @ 07:01  -  01-24-23 @ 08:21  --------------------------------------------------------  IN:    Norepinephrine: 8 mL  Total IN: 8 mL    OUT:    Indwelling Catheter - Urethral (mL): 10 mL  Total OUT: 10 mL    Total NET: -2 mL          LABS:                            9.3    19.98 )-----------( 174      ( 24 Jan 2023 06:17 )             29.0                                               01-24    143  |  108  |  46<H>  ----------------------------<  143<H>  4.1   |  24  |  1.2    Ca    7.9<L>      24 Jan 2023 06:17  Mg     2.5     01-24    TPro  4.5<L>  /  Alb  2.6<L>  /  TBili  0.6  /  DBili  x   /  AST  73<H>  /  ALT  29  /  AlkPhos  74  01-24      PTT - ( 23 Jan 2023 09:28 )  PTT:41.9 sec                                           CARDIAC MARKERS ( 23 Jan 2023 06:15 )  x     / 2.76 ng/mL / x     / x     / x                                                LIVER FUNCTIONS - ( 24 Jan 2023 06:17 )  Alb: 2.6 g/dL / Pro: 4.5 g/dL / ALK PHOS: 74 U/L / ALT: 29 U/L / AST: 73 U/L / GGT: x                                                  Culture - Urine (collected 22 Jan 2023 14:25)  Source: Catheterized Catheterized  Final Report (24 Jan 2023 05:34):    No growth    Culture - Blood (collected 22 Jan 2023 11:09)  Source: .Blood None  Preliminary Report (23 Jan 2023 22:02):    No growth to date.                                                   Mode: AC/ CMV (Assist Control/ Continuous Mandatory Ventilation)  RR (machine): 20  TV (machine): 400  FiO2: 70  PEEP: 8  MAP: 13  PIP: 24                                      ABG - ( 24 Jan 2023 03:11 )  pH, Arterial: 7.45  pH, Blood: x     /  pCO2: 31    /  pO2: 206   / HCO3: 22    / Base Excess: -1.9  /  SaO2: 99.4                MEDICATIONS  (STANDING):  aspirin  chewable 81 milliGRAM(s) Oral daily  atorvastatin 40 milliGRAM(s) Oral at bedtime  busPIRone 30 milliGRAM(s) Oral every 8 hours  chlorhexidine 2% Cloths 1 Application(s) Topical <User Schedule>  chlorhexidine 4% Liquid 1 Application(s) Topical <User Schedule>  clopidogrel Tablet 300 milliGRAM(s) Oral once  clopidogrel Tablet 75 milliGRAM(s) Oral daily  fentaNYL   Infusion. 0.5 MICROgram(s)/kG/Hr (5.5 mL/Hr) IV Continuous <Continuous>  midazolam Infusion 0.02 mG/kG/Hr (2.2 mL/Hr) IV Continuous <Continuous>  norepinephrine Infusion 0.05 MICROgram(s)/kG/Min (5.16 mL/Hr) IV Continuous <Continuous>  piperacillin/tazobactam IVPB.. 3.375 Gram(s) IV Intermittent every 6 hours  propofol Infusion 5 MICROgram(s)/kG/Min (2.38 mL/Hr) IV Continuous <Continuous>    MEDICATIONS  (PRN):  sodium chloride 0.9% lock flush 10 milliLiter(s) IV Push every 1 hour PRN Pre/post blood products, medications, blood draw, and to maintain line patency      New X-rays reviewed:                                                                                  ECHO  b/l opacity   CXR interpreted by me:

## 2023-01-24 NOTE — PROGRESS NOTE ADULT - ASSESSMENT
IMPRESSION:  Acute hypoxemic respiratory failure  S/p Cardio pulmonary arrest  x45min, 13 rounds of epi, 10 shocks  prolonged down time  Episode of asystole and bradycardia on 01/21, s/p PPM placement, set at rate of 60  Multifocal pneumonia possible aspiration   Acute respiratory distress syndrome  Sepsis present on admission  likely anoxic brain damage  enterovirus     PLAN:    CNS: EEG negative for seizures. MRI brain when stable. Neuro following. FU Neuron-specific enolase. keep off sedation    HEENT: Oral care    PULMONARY:  HOB @ 45 degrees. Aspiration precautions. Target Spo2 92-96%, dec FiO2 as tolerated.     CARDIOVASCULAR: ECHO wnl. Troponin downtrending. Cardio following.      GI: GI prophylaxis. KUB NPO for now     RENAL:  Follow up lytes.  Correct as needed. Ray.     INFECTIOUS DISEASE: Cultures negative to date. Procal elevated. FU Fungitell. Nasal MRSA negative. Urine strep/legionella negative. c/w zosyn for now. ID FU.     HEMATOLOGICAL: LE venous duplex negative.     ENDOCRINE:  Follow up FS. Target -180. FU TSH.     MUSCULOSKELETAL: bedrest    LINES  L radial a-line  Right IJ    MICU  very poor prognosis   DNR

## 2023-01-24 NOTE — PROGRESS NOTE ADULT - SUBJECTIVE AND OBJECTIVE BOX
CT MEDINA  67y, Female  Allergy: No Known Allergies      LOS  4d    CHIEF COMPLAINT: CARDIAC ARREST ELEVATED TROPONIN     (23 Jan 2023 19:42)      INTERVAL EVENTS/HPI  - No acute events overnight  - T(F): , Max: 101.8 (01-24-23 @ 14:00)  - remains febrile -- broadened to zosyn yesterday  - WBC Count: 19.98 (01-24-23 @ 06:17)  WBC Count: 22.68 (01-23-23 @ 15:42)     - Creatinine, Serum: 1.2 (01-24-23 @ 06:17)  Creatinine, Serum: 1.1 (01-23-23 @ 06:15)       ROS  unable to obtain history secondary to patient's mental status and/or sedation    VITALS:  T(F): 101.8, Max: 101.8 (01-24-23 @ 14:00)  HR: 51  BP: 92/50  RR: 26Vital Signs Last 24 Hrs  T(C): 38.8 (24 Jan 2023 15:30), Max: 38.8 (24 Jan 2023 14:00)  T(F): 101.8 (24 Jan 2023 15:30), Max: 101.8 (24 Jan 2023 14:00)  HR: 51 (24 Jan 2023 15:30) (46 - 86)  BP: 92/50 (24 Jan 2023 01:15) (92/50 - 124/67)  BP(mean): 59 (24 Jan 2023 01:15) (59 - 86)  RR: 26 (24 Jan 2023 15:30) (17 - 41)  SpO2: 100% (24 Jan 2023 15:30) (93% - 100%)    Parameters below as of 24 Jan 2023 07:00  Patient On (Oxygen Delivery Method): ventilator    O2 Concentration (%): 70    PHYSICAL EXAM:  Gen: intubated  HEENT: Normocephalic, atraumatic  Neck: supple, no lymphadenopathy  CV: Regular rate & regular rhythm  Lungs: decreased BS at bases, no fremitus  Abdomen: Soft, BS present  Ext: Warm, well perfused  Neuro: non focal, sedated  Skin: no rash, no erythema  Lines: no phlebitis    FH: Non-contributory  Social Hx: Non-contributory    TESTS & MEASUREMENTS:                        9.3    19.98 )-----------( 174      ( 24 Jan 2023 06:17 )             29.0     01-24    143  |  108  |  46<H>  ----------------------------<  143<H>  4.1   |  24  |  1.2    Ca    7.9<L>      24 Jan 2023 06:17  Mg     2.5     01-24    TPro  4.5<L>  /  Alb  2.6<L>  /  TBili  0.6  /  DBili  x   /  AST  73<H>  /  ALT  29  /  AlkPhos  74  01-24      LIVER FUNCTIONS - ( 24 Jan 2023 06:17 )  Alb: 2.6 g/dL / Pro: 4.5 g/dL / ALK PHOS: 74 U/L / ALT: 29 U/L / AST: 73 U/L / GGT: x               Culture - Urine (collected 01-22-23 @ 14:25)  Source: Catheterized Catheterized  Final Report (01-24-23 @ 05:34):    No growth    Culture - Blood (collected 01-22-23 @ 11:09)  Source: .Blood None  Preliminary Report (01-23-23 @ 22:02):    No growth to date.    Culture - Aspirate with Gram Stain (collected 01-20-23 @ 16:46)  Source: .Aspirate Aspirate  Gram Stain (01-21-23 @ 14:19):    No polymorphonuclear leukocytes per low power field    No organisms seen per oil power field  Preliminary Report (01-23-23 @ 12:54):    Rare Streptococcus mitis/oralis group    Rare Streptococcus anginosus    "Susceptibilities not performed"    Culture - Urine (collected 01-20-23 @ 16:45)  Source: Catheterized Catheterized  Final Report (01-22-23 @ 07:01):    No growth    Culture - Blood (collected 01-20-23 @ 15:19)  Source: .Blood None  Preliminary Report (01-21-23 @ 23:01):    No growth to date.    Culture - Blood (collected 01-20-23 @ 15:19)  Source: .Blood None  Preliminary Report (01-21-23 @ 23:01):    No growth to date.        Lactate, Blood: 2.7 mmol/L (01-20-23 @ 21:26)  Blood Gas Venous - Lactate: 7.90 mmol/L (01-20-23 @ 07:03)      INFECTIOUS DISEASES TESTING  Procalcitonin, Serum: 2.93 (01-23-23 @ 06:15)  Procalcitonin, Serum: 10.10 (01-21-23 @ 06:24)  Rapid RVP Result: Detected (01-20-23 @ 16:55)  MRSA PCR Result.: Negative (01-20-23 @ 16:55)  Legionella Antigen, Urine: Negative (01-20-23 @ 16:45)  Procalcitonin, Serum: 3.90 (01-20-23 @ 15:19)  Fungitell: SeeComment **RESULTS OF FUNGITELL INCONCLUSIVE DUE TO THE PRESENCE OF  HEMOLYSIS. PLEASE SUBMIT ANOTHER SPECIMEN FOR TESTING. PLEASE  CONTACT [employer] WITH QUESTIONS.  Interpretation: The Fungitell assay does not detect certain fungal  species such as the genus Cryptococcus (Justin et al. 1991) which  produces very low levels of (1-3)-Beta-D-Glucan. The assay also does  not detect the Zygomycetes such as Absidia, Mucor and Rhizopus  (Ivis et al. 1994) which are not known to produce  (1-3)-Beta-D-Glucan. In addition, the yeast phase of Blastomyces  dermatitidis produces little (1-3)-Beta-D-Glucan and may not be  detected by the assay (Александр et al. 2007).  Reference Range:  Less than 60 pg/mL. Glucan values of less than 60 pg/mL are  interpreted as negative.  Glucan values of 60 to 79 pg/mL are interpreted as indeterminate,  and suggest a possible fungal infection. Additional sampling and  testing of sera is required to interpret the results.  Glucan values of greater than or equal to 80 pg/mL are interpreted  as positive.  Due to the potential for environmental contamination when  transferred to pour-off tubes, which can lead to false positive  results, interpret positive results from samples provided in  pour-off tubes with caution.  Results should be used in conjunction  with clinical findings, and should not form the sole basis for a  diagnosis or treatment decision. The Fungitell test is approved or  cleared for in vitro diagnostic use by the U.[place] Food and Drug  Administration. Modifications to the approved package insert have  been made and the performance characteristics for these  modifications were determined by [employer].  If sample result is greater than 500 pg/mL, physician may order a  titer of the sample. Please contact [facility] if you would  like to order a retest of this sample to obtain an actual value.  Samples are held for 1 week after initial testing date.  ____________________________________________________________  Performedat:  [facility]  [address] [place] [address]  : Adama Vines Ph.D., ALEX (ABB)  CLIA#: [id number]  Phone: [phone] (01-20-23 @ 15:19)  COVID-19 PCR: NotDetec (01-20-23 @ 08:00)      INFLAMMATORY MARKERS      RADIOLOGY & ADDITIONAL TESTS:  I have personally reviewed the last available Chest xray  CXR  Xray Chest 1 View- PORTABLE-Urgent:   ACC: [id number] EXAM:  XR CHEST PORTABLE URGENT 1V   ORDERED BY: MARIA EUGENIA HURD     PROCEDURE DATE:  01/23/2023          INTERPRETATION:  Clinical History / Reason for exam: Intubated    Comparison : Chest radiograph January 23, 2023.    Technique/Positioning: Frontal chest radiograph.    Findings:    Support devices: ET tube terminates in the midtrachea. Enteric tube   courses below the diaphragm, out of the field-of-view. Right IJ central   venous catheter and a transvenous pacer. Multiple additional wires   overlie the chest    Cardiac/mediastinum/hilum: Unchanged.    Lung parenchyma/Pleura: Low lung volumes with unchanged bibasilar   opacities, right greater than left. No pneumothorax.    Skeleton/soft tissues: Unchanged.    Impression:    Low lung volumes with unchanged bibasilar opacities.  Multiple support devices as above.    --- End of Report ---            TYLER BERMUDEZ MD; Attending Radiologist  This document has been electronically signed. Jan 24 2023 11:03AM (01-23-23 @ 19:06)      CT      CARDIOLOGY TESTING  12 Lead ECG:   Ventricular Rate 51 BPM    Atrial Rate 138 BPM    QRS Duration 178 ms    Q-T Interval 664 ms    QTC Calculation(Bazett) 611 ms    R Axis -61 degrees    T Axis 54 degrees    Diagnosis Line Ventricular-paced rhythm with occasional Premature ventricular complexes  Abnormal ECG    Confirmed by FRANCOISE MADSEN MD ([id number]) on 1/24/2023 9:22:05 AM (01-24-23 @ 07:12)  12 Lead ECG:   Ventricular Rate 60 BPM    Atrial Rate 66 BPM    QRS Duration 140 ms    Q-T Interval 566 ms    QTC Calculation(Bazett) 566 ms    R Axis -45 degrees    T Axis 109 degrees    Diagnosis Line Ventricular-paced rhythm with occasional Premature ventricular complexes  Abnormal ECG    Confirmed by Scot Murrell ([id number]) on 1/23/2023 10:21:36 AM (01-23-23 @ 09:22)      MEDICATIONS  aspirin  chewable 81 Oral daily  atorvastatin 40 Oral at bedtime  busPIRone 30 Oral every 8 hours  chlorhexidine 2% Cloths 1 Topical <User Schedule>  chlorhexidine 4% Liquid 1 Topical <User Schedule>  clopidogrel Tablet 300 Oral once  clopidogrel Tablet 75 Oral daily  fentaNYL   Infusion. 0.5 IV Continuous <Continuous>  midazolam Infusion 0.02 IV Continuous <Continuous>  norepinephrine Infusion 0.05 IV Continuous <Continuous>  piperacillin/tazobactam IVPB.. 3.375 IV Intermittent every 6 hours  propofol Infusion 5 IV Continuous <Continuous>      WEIGHT  Weight (kg): 110 (01-20-23 @ 11:52)  Creatinine, Serum: 1.2 mg/dL (01-24-23 @ 06:17)      ANTIBIOTICS:  piperacillin/tazobactam IVPB.. 3.375 Gram(s) IV Intermittent every 6 hours      All available historical records have been reviewed

## 2023-01-24 NOTE — PROGRESS NOTE ADULT - ASSESSMENT
67 year old F with no past medical history (does not see doctors) was found by   unresponsive - called EMS who found the pt pulseless and coded pt ~45min s/p 10 shocks then ROSC.  at bedside reports pt was feeling  fine, not sick, no chest pains or SOB.        prolonged Cardiac arrest / aspiration pneumonia - with possible sepsis POA  / multiple rib fractures from CPR / suspected anoxic brain injury / CHB s/p TVP       - pressors to maintain MAP >65   - broad spectrum antibiotics   - follow mental status off sedation   - very poor prognosis - family aware

## 2023-01-24 NOTE — PROGRESS NOTE ADULT - ASSESSMENT
Patient off sedation on vent. On low dose pressors. Not responsive. Pacemaker repositioned. Threshold .6 MA. Rate set 50 BPM MA of 3.5. Check cultures. Continue support. Check eeg. Prognosis poor

## 2023-01-24 NOTE — EEG REPORT - NS EEG TEXT BOX
Epilepsy Attending Note:     CT MEDINA    67y Female  MRN [id number]    Vital Signs Last 24 Hrs  T(C): 38.2 (2023 11:00), Max: 38.8 (2023 12:00)  T(F): 100.8 (2023 11:00), Max: 101.8 (2023 12:00)  HR: 52 (2023 11:30) (46 - 86)  BP: 92/50 (2023 01:15) (92/50 - 124/67)  BP(mean): 59 (2023 01:15) (59 - 86)  RR: 34 (2023 11:30) (20 - 41)  SpO2: 99% (2023 11:30) (52% - 100%)    Parameters below as of 2023 07:00  Patient On (Oxygen Delivery Method): ventilator    O2 Concentration (%): 70                          9.3    19.98 )-----------( 174      ( 2023 06:17 )             29.0           143  |  108  |  46<H>  ----------------------------<  143<H>  4.1   |  24  |  1.2    Ca    7.9<L>      2023 06:17  Mg     2.5         TPro  4.5<L>  /  Alb  2.6<L>  /  TBili  0.6  /  DBili  x   /  AST  73<H>  /  ALT  29  /  AlkPhos  74        MEDICATIONS  (STANDING):  aspirin  chewable 81 milliGRAM(s) Oral daily  atorvastatin 40 milliGRAM(s) Oral at bedtime  busPIRone 30 milliGRAM(s) Oral every 8 hours  chlorhexidine 2% Cloths 1 Application(s) Topical <User Schedule>  chlorhexidine 4% Liquid 1 Application(s) Topical <User Schedule>  clopidogrel Tablet 300 milliGRAM(s) Oral once  clopidogrel Tablet 75 milliGRAM(s) Oral daily  fentaNYL   Infusion. 0.5 MICROgram(s)/kG/Hr (5.5 mL/Hr) IV Continuous <Continuous>  midazolam Infusion 0.02 mG/kG/Hr (2.2 mL/Hr) IV Continuous <Continuous>  norepinephrine Infusion 0.05 MICROgram(s)/kG/Min (5.16 mL/Hr) IV Continuous <Continuous>  piperacillin/tazobactam IVPB.. 3.375 Gram(s) IV Intermittent every 6 hours  propofol Infusion 5 MICROgram(s)/kG/Min (2.38 mL/Hr) IV Continuous <Continuous>    MEDICATIONS  (PRN):  sodium chloride 0.9% lock flush 10 milliLiter(s) IV Push every 1 hour PRN Pre/post blood products, medications, blood draw, and to maintain line patency            VEEG in the last 24 hours:    Background - very low amplitude, at the sensitivity of 3-5 mcV there is evidence for cortical activity, reaching frequencies in the range of 3-4 Hz that is expressed better form the left hemsiphere    Focal and generalized slowin. severe generalized slowing  2. suggestive of right hemispheric focal slowing    Interictal activity - moderate number of diffusely expressed sharp transients    Events - none    Seizures - none    Impression: As above    Plan - per neurology team

## 2023-01-24 NOTE — PROGRESS NOTE ADULT - ASSESSMENT
ASSESSMENT  67-year-old female no known medical history brought in by EMS as postcardiac arrest.    IMPRESSION  #Cardiac Arrest with prolonged CPR   #Hypoxic Respiratory Failure  #Multifocal Pneumonia/Aspiration Pneumonia   - CT Angio Chest PE Protocol w/ IV Cont (01.20.23 @ 09:24): Dense multifocal airspace consolidation seen bilaterally centered at the   lung bases and apices suspicious for multifocal pneumonia. Trace right  pleural effusion  - sputum cx 1/21 Strep Mitis/Anginosus -- likely large volume aspiration   - MRSA PCR Result.: Negative: By: Real-Time PCR (Polymerase Reaction Method) (01.20.23 @ 16:55)    #Enterovirus/Rhinovirus Respiratory tract infection     #Leukocytosis with Lymphocytosis   #Transaminitis  #Obesity BMI (kg/m2): 44.3  #Abx allergy: NKDA      RECOMMENDATIONS  - continue zosyn 3.375 mg q 8 hours   - possible central fever as well -- repeat CT head   - trend fever curve     Please call or message on Microsoft Teams if with any questions.  Spectra [id number]

## 2023-01-24 NOTE — PROGRESS NOTE ADULT - SUBJECTIVE AND OBJECTIVE BOX
Patient is a 67y old  Female who presents with a chief complaint of CARDIAC ARREST ELEVATED TROPONIN     (23 Jan 2023 19:42)      T(F): 99.1 (01-24-23 @ 05:00), Max: 101.8 (01-23-23 @ 12:00)  HR: 48 (01-24-23 @ 06:30)  BP: 92/50 (01-24-23 @ 01:15)  RR: 25 (01-24-23 @ 06:30)  SpO2: 98% (01-24-23 @ 06:30) (52% - 100%)    PHYSICAL EXAM:  GENERAL: NAD, well-groomed, well-developed  HEAD:  Atraumatic, Normocephalic  EYES: EOMI, PERRLA, conjunctiva and sclera clear  ENMT: No tonsillar erythema, exudates, or enlargement; Moist mucous membranes, Good dentition, No lesions  NECK: Supple, No JVD, Normal thyroid  NERVOUS SYSTEM:  Alert & Oriented X3,  Motor Strength 5/5 B/L upper and lower extremities  CHEST/LUNG: Clear to percussion bilaterally; No rales, rhonchi, wheezing, or rubs  HEART: Regular rate and rhythm; No murmurs, rubs, or gallops  ABDOMEN: Soft, Nontender, Nondistended; Bowel sounds present  EXTREMITIES:   No clubbing, cyanosis, or edema  LYMPH: No lymphadenopathy noted  SKIN: No rashes or lesions    labs  01-23    144  |  111<H>  |  36<H>  ----------------------------<  120<H>  4.0   |  22  |  1.1    Ca    7.5<L>      23 Jan 2023 06:15  Mg     2.4     01-23    TPro  4.5<L>  /  Alb  2.5<L>  /  TBili  0.5  /  DBili  x   /  AST  104<H>  /  ALT  40  /  AlkPhos  69  01-23                          9.3    19.98 )-----------( 174      ( 24 Jan 2023 06:17 )             29.0       Culture - Urine (collected 22 Jan 2023 14:25)  Source: Catheterized Catheterized  Final Report (24 Jan 2023 05:34):    No growth    Culture - Blood (collected 22 Jan 2023 11:09)  Source: .Blood None  Preliminary Report (23 Jan 2023 22:02):    No growth to date.      PTT - ( 23 Jan 2023 09:28 )  PTT:41.9 sec  Mode: AC/ CMV (Assist Control/ Continuous Mandatory Ventilation)  RR (machine): 20  TV (machine): 400  FiO2: 70  PEEP: 8  MAP: 13  PIP: 24        aspirin  chewable 81 milliGRAM(s) Oral daily  atorvastatin 40 milliGRAM(s) Oral at bedtime  busPIRone 30 milliGRAM(s) Oral every 8 hours  chlorhexidine 2% Cloths 1 Application(s) Topical <User Schedule>  chlorhexidine 4% Liquid 1 Application(s) Topical <User Schedule>  clopidogrel Tablet 300 milliGRAM(s) Oral once  clopidogrel Tablet 75 milliGRAM(s) Oral daily  fentaNYL   Infusion. 0.5 MICROgram(s)/kG/Hr IV Continuous <Continuous>  midazolam Infusion 0.02 mG/kG/Hr IV Continuous <Continuous>  norepinephrine Infusion 0.05 MICROgram(s)/kG/Min IV Continuous <Continuous>  piperacillin/tazobactam IVPB.. 3.375 Gram(s) IV Intermittent every 6 hours  propofol Infusion 5 MICROgram(s)/kG/Min IV Continuous <Continuous>  sodium chloride 0.9% lock flush 10 milliLiter(s) IV Push every 1 hour PRN

## 2023-01-24 NOTE — PROGRESS NOTE ADULT - ASSESSMENT
IMPRESSION:  Acute hypoxemic respiratory failure  S/p Cardio pulmonary arrest  x45min, 13 rounds of epi, 10 shocks  prolonged down time  Episode of asystole and bradycardia on 01/21, s/p PPM placement, set at rate of 60  Multifocal pneumonia possible aspiration   Acute respiratory distress syndrome  Sepsis present on admission  likely anoxic brain damage  enterovirus     PLAN:    CNS: EEG negative for seizures. Repeat CT Head when stable. Neuro following. FU Neuron-specific enolase. SAT today to assess mental status.     HEENT: Oral care    PULMONARY:  HOB @ 45 degrees. Aspiration precautions Vent changes: dec RR to 18. Target Spo2 92-96%, dec FiO2 as tolerated.     CARDIOVASCULAR: ECHO wnl. Troponin downtrending. Cardio following.  Goal directed fluid resuscitation. 500cc LR bolus. CHEETAH.     GI: GI prophylaxis. Start OG feeds.     RENAL:  Follow up lytes.  Correct as needed. Ray.     INFECTIOUS DISEASE: Cultures negative to date. Procal elevated. FU Fungitell. Nasal MRSA negative. Urine strep/legionella negative. Start zosyn for now. DC rocephin and flagyl. ID FU.     HEMATOLOGICAL: LE venous duplex negative. FU cardio in regards to heparin drip duration. Trend cbc q12h.     ENDOCRINE:  Follow up FS. Target -180. FU TSH.     MUSCULOSKELETAL: bedrest    LINES:  R fem TLC, DC and place IJ TLC  L radial a-line    MICU  very poor prognosis   DNR IMPRESSION:  Acute hypoxemic respiratory failure  S/p Cardio pulmonary arrest  x45min, 13 rounds of epi, 10 shocks  prolonged down time  Episode of asystole and bradycardia on 01/21, s/p PPM placement, set at rate of 60  Multifocal pneumonia possible aspiration   Acute respiratory distress syndrome  Sepsis present on admission  likely anoxic brain damage  enterovirus     PLAN:    CNS: EEG negative for seizures. Repeat CT Head when stable. Neuro following. FU Neuron-specific enolase.    HEENT: Oral care    PULMONARY:  HOB @ 45 degrees. Aspiration precautions Vent changes: dec RR to 18. Target Spo2 92-96%, dec FiO2 as tolerated.     CARDIOVASCULAR: ECHO wnl. Troponin downtrending. Cardio following.  Goal directed fluid resuscitation. 500cc LR bolus. CHEETAH.     GI: GI prophylaxis. KUB NPO for now     RENAL:  Follow up lytes.  Correct as needed. Ray.     INFECTIOUS DISEASE: Cultures negative to date. Procal elevated. FU Fungitell. Nasal MRSA negative. Urine strep/legionella negative. Start zosyn for now. DC rocephin and flagyl. ID FU.     HEMATOLOGICAL: LE venous duplex negative. FU cardio in regards to heparin drip duration. Trend cbc q12h.     ENDOCRINE:  Follow up FS. Target -180. FU TSH.     MUSCULOSKELETAL: bedrest    LINES  L radial a-line    MICU  very poor prognosis   DNR

## 2023-01-24 NOTE — PROGRESS NOTE ADULT - SUBJECTIVE AND OBJECTIVE BOX
Patient seen and evaluated this am, remains unresponsive off sedation on ventilator       T(F): 99.3 (01-24-23 @ 07:01), Max: 101.8 (01-23-23 @ 12:00)  HR: 51 (01-24-23 @ 09:00)  BP: 92/50 (01-24-23 @ 01:15)  RR: 23 (01-24-23 @ 09:00)  SpO2: 100% (01-24-23 @ 09:00) (52% - 100%)    PHYSICAL EXAM:  GENERAL: NAD  HEAD:  Atraumatic, Normocephalic  EYES: EOMI, PERRLA, conjunctiva and sclera clear  NERVOUS SYSTEM:  positive gag    CHEST/LUNG:  bilateral rales  HEART: Regular rate and rhythm; No murmurs, rubs, or gallops  ABDOMEN: Soft, Nontender, Nondistended; Bowel sounds present  EXTREMITIES:  2+ Peripheral Pulses, No clubbing, cyanosis, or edema    LABS  01-24    143  |  108  |  46<H>  ----------------------------<  143<H>  4.1   |  24  |  1.2    Ca    7.9<L>      24 Jan 2023 06:17  Mg     2.5     01-24    TPro  4.5<L>  /  Alb  2.6<L>  /  TBili  0.6  /  DBili  x   /  AST  73<H>  /  ALT  29  /  AlkPhos  74  01-24                          9.3    19.98 )-----------( 174      ( 24 Jan 2023 06:17 )             29.0   Procalcitonin, Serum (01.23.23 @ 06:15)   Procalcitonin, Serum: 10.1 -> 2.93    PTT - ( 23 Jan 2023 09:28 )  PTT:41.9 sec    Mode: AC/ CMV (Assist Control/ Continuous Mandatory Ventilation)  RR (machine): 20  TV (machine): 400  FiO2: 60  PEEP: 8      CARDIAC ENZYMES    Troponin T, Serum: 2.76 ng/mL (01-23-23 @ 06:15)  Troponin T, Serum: 4.32 ng/mL (01-22-23 @ 06:07)  Troponin T, Serum: 6.53 ng/mL (01-21-23 @ 11:00)  < from: TTE Echo Complete w/o Contrast w/ Doppler (01.20.23 @ 14:39) >    Summary:   1. Apical septal segment, apical inferior segment, and apex are abnormal   as described above.   2. LV Ejection Fraction by Sepulveda's Method with a biplane EF of 45 %.   3. Normal left atrial size.   4. There is no evidence of pericardial effusion.   5. Mild mitral annular calcification.   6. Mild mitral valve regurgitation.   7. There is mild aortic root calcification.    < end of copied text >      Culture Results:   No growth (01-22-23)  Culture Results:   No growth to date. (01-22-23)  Culture Results:   Rare Streptococcus mitis/oralis group  Rare Streptococcus anginosus  "Susceptibilities not performed" (01-20-23)  Culture Results:   No growth (01-20-23)  Culture Results:   No growth to date. (01-20-23)  Culture Results:   No growth to date. (01-20-23)    RADIOLOGY  < from: Xray Chest 1 View- PORTABLE-Routine (Xray Chest 1 View- PORTABLE-Routine in AM.) (01.23.23 @ 05:50) >  Lung parenchyma/Pleura: Bilateral lung opacities, unchanged. Suboptimal   visualization of the left upper and midlung.    < end of copied text >    MEDICATIONS  (STANDING):  aspirin  chewable 81 milliGRAM(s) Oral daily  atorvastatin 40 milliGRAM(s) Oral at bedtime  busPIRone 30 milliGRAM(s) Oral every 8 hours  chlorhexidine 2% Cloths 1 Application(s) Topical <User Schedule>  chlorhexidine 4% Liquid 1 Application(s) Topical <User Schedule>  clopidogrel Tablet 300 milliGRAM(s) Oral once  clopidogrel Tablet 75 milliGRAM(s) Oral daily  fentaNYL   Infusion. 0.5 MICROgram(s)/kG/Hr (5.5 mL/Hr) IV Continuous <Continuous>  norepinephrine Infusion 0.05 MICROgram(s)/kG/Min (5.16 mL/Hr) IV Continuous <Continuous>  piperacillin/tazobactam IVPB.. 3.375 Gram(s) IV Intermittent every 6 hours      MEDICATIONS  (PRN):  sodium chloride 0.9% lock flush 10 milliLiter(s) IV Push every 1 hour PRN Pre/post blood products, medications, blood draw, and to maintain line patency

## 2023-01-25 NOTE — PROGRESS NOTE ADULT - SUBJECTIVE AND OBJECTIVE BOX
Neurology Follow up note    Name  CT MEDINA    HPI:  "67-year-old female no known medical history brought in by EMS as postcardiac arrest.  Patient found down at home by , last seen at home last night by family members this morning family found patient unresponsive.  Upon EMS arrival ACLS performed, patient received 10 shock, 13 epi.  ROSC achieved, patient intubated in the field.  As per ED - poke with patient's  Obi, and 2 adult children in person, all confirmed that the patient has no known medical problems, but has not seen a doctor in over 40 years and does not take any medications.  Per the family, patient was in her usual state of health, cooked her  dinner and did not have any complaints.  This morning, the  heard a gurgling sound, and thought she had a bad dream.  Upon further examination, he realized she was unresponsive, and not breathing, prompting him to call 911.    Bught in by EMS status post cardiac arrest, found gurgling by , received defibrillation x10 with a total of 450 mg of amiodarone given and 3 epinephrines, on arrival in a wide-complex rhythm, given 3 g of magnesium, calcium, insulin, bicarbonate, with narrowing of complexes, was overbreathing vent and biting tube requiring sedation, had blood from ET tube approximately 200 cc of which was suctioned."    Patient admitted to CCU. Seen in unit, children at bedside. As per ED, confirmed pt's history.  (20 Jan 2023 14:02)      Interval History -  Ct head today showed cerebral edema           Vital Signs Last 24 Hrs  T(C): 38.7 (25 Jan 2023 11:00), Max: 39.5 (25 Jan 2023 03:01)  T(F): 101.7 (25 Jan 2023 11:00), Max: 103.1 (25 Jan 2023 03:01)  HR: 44 (25 Jan 2023 14:30) (44 - 66)  BP: 113/50 (25 Jan 2023 12:00) (113/50 - 143/85)  BP(mean): 69 (25 Jan 2023 12:00) (69 - 69)  RR: 23 (25 Jan 2023 14:30) (20 - 49)  SpO2: 99% (25 Jan 2023 14:30) (94% - 100%)    Parameters below as of 25 Jan 2023 14:00  Patient On (Oxygen Delivery Method): ventilator    O2 Concentration (%): 40  ICU Vital Signs Last 24 Hrs  T(C): 38.7 (25 Jan 2023 11:00), Max: 39.5 (25 Jan 2023 03:01)  T(F): 101.7 (25 Jan 2023 11:00), Max: 103.1 (25 Jan 2023 03:01)  HR: 44 (25 Jan 2023 14:30) (44 - 66)  BP: 113/50 (25 Jan 2023 12:00) (113/50 - 143/85)  BP(mean): 69 (25 Jan 2023 12:00) (69 - 69)  ABP: 122/50 (25 Jan 2023 14:30) (97/42 - 161/62)  ABP(mean): 68 (25 Jan 2023 14:30) (58 - 93)  RR: 23 (25 Jan 2023 14:30) (20 - 49)  SpO2: 99% (25 Jan 2023 14:30) (94% - 100%)    O2 Parameters below as of 25 Jan 2023 14:00  Patient On (Oxygen Delivery Method): ventilator    O2 Concentration (%): 40      Neurological Exam:   Intubated and on Propofol.   Pupils are 3 mm and sluggish reactive   Facial grimacing to noxious   Flexion response to noxious in UE and LE.     Medications  acetaminophen     Tablet .. 650 milliGRAM(s) Oral every 6 hours PRN  aspirin  chewable 81 milliGRAM(s) Oral daily  atorvastatin 40 milliGRAM(s) Oral at bedtime  busPIRone 30 milliGRAM(s) Oral every 8 hours  chlorhexidine 0.12% Liquid 15 milliLiter(s) Oral Mucosa every 12 hours  chlorhexidine 2% Cloths 1 Application(s) Topical <User Schedule>  chlorhexidine 4% Liquid 1 Application(s) Topical <User Schedule>  clopidogrel Tablet 75 milliGRAM(s) Oral daily  clopidogrel Tablet 300 milliGRAM(s) Oral once  fentaNYL   Infusion. 0.5 MICROgram(s)/kG/Hr IV Continuous <Continuous>  heparin   Injectable 5000 Unit(s) SubCutaneous every 12 hours  levETIRAcetam  IVPB 500 milliGRAM(s) IV Intermittent every 12 hours  LORazepam   Injectable 1 milliGRAM(s) IV Push every 12 hours PRN  midazolam Infusion 0.02 mG/kG/Hr IV Continuous <Continuous>  norepinephrine Infusion 0.05 MICROgram(s)/kG/Min IV Continuous <Continuous>  piperacillin/tazobactam IVPB.. 3.375 Gram(s) IV Intermittent every 6 hours  polyethylene glycol 3350 17 Gram(s) Oral daily  propofol Infusion 5 MICROgram(s)/kG/Min IV Continuous <Continuous>  senna 2 Tablet(s) Oral at bedtime  sodium chloride 0.9% lock flush 10 milliLiter(s) IV Push every 1 hour PRN      Lab                        9.2    21.02 )-----------( 263      ( 25 Jan 2023 05:39 )             29.4     01-25    146  |  111<H>  |  47<H>  ----------------------------<  128<H>  4.9   |  22  |  1.3    Ca    8.3<L>      25 Jan 2023 05:39  Mg     2.6     01-25    TPro  4.8<L>  /  Alb  2.6<L>  /  TBili  0.6  /  DBili  x   /  AST  75<H>  /  ALT  23  /  AlkPhos  88  01-25    CAPILLARY BLOOD GLUCOSE        LIVER FUNCTIONS - ( 25 Jan 2023 05:39 )  Alb: 2.6 g/dL / Pro: 4.8 g/dL / ALK PHOS: 88 U/L / ALT: 23 U/L / AST: 75 U/L / GGT: x           CT head:   Interval development of diffuse cerebral edema and diffuse loss of   gray-white differentiation consistent with global hypoxic ischemic injury.

## 2023-01-25 NOTE — PROGRESS NOTE ADULT - ASSESSMENT
Patient off sedation on vent. Responds to pain only. On low dose pressors.  Pacemaker  Threshold .4 MA. Rate set 50 BPM MA of 3.5. Check cultures. Continue support. Check eeg. Need define goals of care.  Prognosis poor

## 2023-01-25 NOTE — PROGRESS NOTE ADULT - ASSESSMENT
IMPRESSION:  Acute hypoxemic respiratory failure  S/p Cardio pulmonary arrest  x45min, 13 rounds of epi, 10 shocks  Prolonged down time in field without compressions prior to EMS arrival  Episode of asystole and bradycardia on 01/21, s/p PPM placement, set at rate of 60  Multifocal pneumonia possible aspiration   Acute respiratory distress syndrome  Sepsis present on admission  Likely anoxic brain injury  Enterovirus     PLAN:    CNS: EEG negative for seizures. Repeat CT Head when stable. Neuro following. FU Neuron-specific enolase.    HEENT: Oral care    PULMONARY:  HOB @ 45 degrees. Aspiration precautions Vent changes: dec RR to 18. Target Spo2 92-96%, dec FiO2 as tolerated.     CARDIOVASCULAR: ECHO wnl. Troponin downtrending. Cardio following.  Goal directed fluid resuscitation. 500cc LR bolus. CHEETAH.     GI: GI prophylaxis. KUB NPO for now     RENAL:  Follow up lytes.  Correct as needed. Ray.     INFECTIOUS DISEASE: Cultures negative to date. Procal elevated. FU Fungitell. Nasal MRSA negative. Urine strep/legionella negative. Start zosyn for now. DC rocephin and flagyl. ID FU.     HEMATOLOGICAL: LE venous duplex negative. FU cardio in regards to heparin drip duration. Trend cbc q12h.     ENDOCRINE:  Follow up FS. Target -180. FU TSH.     MUSCULOSKELETAL: bedrest    LINES  L radial a-line    MICU  very poor prognosis   DNR IMPRESSION:  Acute hypoxemic respiratory failure  S/p Cardio pulmonary arrest  x45min, 13 rounds of epi, 10 shocks  Prolonged down time in field without compressions prior to EMS arrival  Episode of asystole and bradycardia on 01/21, s/p PPM placement, set at rate of 60  Multifocal pneumonia possible aspiration   Acute respiratory distress syndrome  Sepsis present on admission  Likely anoxic brain injury  Enterovirus     PLAN:    CNS: EEG negative for seizures. Repeat CT Head when stable. Neuro following.  Neuron-specific enolase elevated   recall neurology   repeat ct brain   HEENT: Oral care    PULMONARY:  HOB @ 45 degrees. Aspiration precautions Vent changes:  lower FIo2 to 40   SBT when wake up     CARDIOVASCULAR: ECHO wnl. Troponin downtrending. Cardio following.  Goal directed fluid resuscitation.   follow cardiology   GI: GI prophylaxis. feed 10cc/ hr   bowel regimen     RENAL:  Follow up lytes.  Correct as needed. Ray.     INFECTIOUS DISEASE: Cultures negative to date. Procal elevated. FU Fungitell. Nasal MRSA negative. Urine strep/legionella negative. Start zosyn for now. DC rocephin and flagyl. ID FU.   ?? central fever   nasal mrsa   start vanco   fungetil     HEMATOLOGICAL: LE venous duplex negative. heprain SQ       ENDOCRINE:  Follow up FS. Target -180. FU TSH.     MUSCULOSKELETAL: bedrest    LINES  L radial a-line    MICU  very poor prognosis   DNR  case discussed with family  IMPRESSION:  Acute hypoxemic respiratory failure  S/p Cardio pulmonary arrest  x45min, 13 rounds of epi, 10 shocks  Prolonged down time in field without compressions prior to EMS arrival  Episode of asystole and bradycardia on 01/21, s/p PPM placement, set at rate of 60  Multifocal pneumonia possible aspiration   Acute respiratory distress syndrome  Sepsis present on admission  Likely anoxic brain injury  Enterovirus     PLAN:    CNS: EEG negative for seizures. Repeat CT Head when stable. Neuro following.  Neuron-specific enolase elevated   recall neurology   repeat ct brain   HEENT: Oral care    PULMONARY:  HOB @ 45 degrees. Aspiration precautions Vent changes:  lower FIo2 to 40   SBT when wake up     CARDIOVASCULAR: ECHO wnl. Troponin downtrending. Cardio following.  Goal directed fluid resuscitation.   follow cardiology   GI: GI prophylaxis. feed 10cc/ hr   bowel regimen     RENAL:  Follow up lytes.  Correct as needed. Ray.     INFECTIOUS DISEASE: Cultures negative to date. Procal elevated. FU Fungitell. Nasal MRSA negative. Urine strep/legionella negative. Start zosyn for now. DC rocephin and flagyl. ID FU.   ?? central fever   nasal mrsa   start vanco   fungetil     HEMATOLOGICAL: LE venous duplex negative. heprain SQ       ENDOCRINE:  Follow up FS. Target -180. FU TSH.     MUSCULOSKELETAL: bedrest    LINES  L radial a-line    MICU  very poor prognosis   DNR  case discussed with family   discus with family about organ donation eval they refused

## 2023-01-25 NOTE — PROGRESS NOTE ADULT - ASSESSMENT
IMPRESSION:  Acute hypoxemic respiratory failure  S/p Cardio pulmonary arrest  x45min, 13 rounds of epi, 10 shocks  Prolonged down time in field without compressions prior to EMS arrival  Episode of asystole and bradycardia on 01/21, s/p PPM placement, set at rate of 60  Multifocal pneumonia possible aspiration   Acute respiratory distress syndrome  Sepsis present on admission  Likely anoxic brain injury  Enterovirus     PLAN:    CNS: EEG negative for seizures. Repeat CT Head when stable. Neuro following.  Neuron-specific enolase elevated   recall neurology   repeat ct brain   HEENT: Oral care    PULMONARY:  HOB @ 45 degrees. Aspiration precautions Vent changes:  lower FIo2 to 40   SBT when wake up     CARDIOVASCULAR: ECHO wnl. Troponin downtrending. Cardio following.  Goal directed fluid resuscitation.   follow cardiology   GI: GI prophylaxis. feed 10cc/ hr   bowel regimen     RENAL:  Follow up lytes.  Correct as needed. Ray.     INFECTIOUS DISEASE: Cultures negative to date. Procal elevated. FU Fungitell. Nasal MRSA negative. Urine strep/legionella negative. Start zosyn for now. DC rocephin and flagyl. ID FU.   ?? central fever   nasal mrsa negative  start vanco - 1gr once  fungetil     HEMATOLOGICAL: LE venous duplex negative. heprain SQ       ENDOCRINE:  Follow up FS. Target -180. FU TSH.     MUSCULOSKELETAL: bedrest    LINES  L radial a-line  TLC right IJ    MICU  very poor prognosis   DNR

## 2023-01-25 NOTE — PROGRESS NOTE ADULT - ASSESSMENT
67-year-old female no known medical history brought in by EMS as postcardiac arrest.    IMPRESSION  #Cardiac Arrest with prolonged CPR   #Hypoxic Respiratory Failure  #Multifocal Pneumonia/Aspiration Pneumonia   - CT Angio Chest PE Protocol w/ IV Cont (01.20.23 @ 09:24): Dense multifocal airspace consolidation seen bilaterally centered at the   lung bases and apices suspicious for multifocal pneumonia. Trace right  pleural effusion  - sputum cx 1/21 Strep Mitis/Anginosus -- likely large volume aspiration   - MRSA PCR Result.: Negative: By: Real-Time PCR (Polymerase Reaction Method) (01.20.23 @ 16:55)    #Enterovirus/Rhinovirus Respiratory tract infection     #Leukocytosis with Lymphocytosis   #Transaminitis  #Obesity BMI (kg/m2): 44.3  #Abx allergy: NKDA      RECOMMENDATIONS  - continue zosyn 3.375 mg q 8 hours   - possible central fever as well -- repeat CT head   - trend fever curve    67-year-old female no known medical history brought in by EMS as postcardiac arrest.    IMPRESSION  #Cardiac Arrest with prolonged CPR   #Hypoxic Respiratory Failure  #Multifocal Pneumonia/Aspiration Pneumonia   - CT Angio Chest PE Protocol w/ IV Cont (01.20.23 @ 09:24): Dense multifocal airspace consolidation seen bilaterally centered at the   lung bases and apices suspicious for multifocal pneumonia. Trace right  pleural effusion  - sputum cx 1/21 Strep Mitis/Anginosus -- likely large volume aspiration   - MRSA PCR Result.: Negative: By: Real-Time PCR (Polymerase Reaction Method) (01.20.23 @ 16:55)    #Enterovirus/Rhinovirus Respiratory tract infection   #Leukocytosis with Lymphocytosis   #Transaminitis  # Interval development of diffuse cerebral edema - on CT head form 1/25/23  would recommend:    1. Monitor WBC count  2. Continue zosyn 3.375 mg q 8 hours   3. Monitor Temp. and c/w supportive care, most likely central fever  4. Aspiration precaution  5. Management of Vent as per ICU protocol  6. Management of cerebral edema as per ICU/Neurology team    d/w CCU/ICU team      Attending Attestation:     Spent more than 35 minutes on total encounter, more than 50 % of the visit was spent counseling and/or coordinating care by the Attending physician.

## 2023-01-25 NOTE — PROGRESS NOTE ADULT - SUBJECTIVE AND OBJECTIVE BOX
Patient is a 67y old  Female who presents with a chief complaint of CARDIAC ARREST ELEVATED TROPONIN     (23 Jan 2023 19:42)        Over Night Events:        ROS:     All pertinent ROS are negative except HPI         PHYSICAL EXAM    ICU Vital Signs Last 24 Hrs  T(C): 38.8 (25 Jan 2023 07:01), Max: 39.5 (25 Jan 2023 03:01)  T(F): 101.8 (25 Jan 2023 07:01), Max: 103.1 (25 Jan 2023 03:01)  HR: 55 (25 Jan 2023 07:30) (50 - 59)  ABP: 132/51 (25 Jan 2023 07:30) (94/39 - 161/62)  ABP(mean): 75 (25 Jan 2023 07:30) (56 - 93)  RR: 27 (25 Jan 2023 07:30) (17 - 49)  SpO2: 99% (25 Jan 2023 07:30) (94% - 100%)    O2 Parameters below as of 25 Jan 2023 07:00  Patient On (Oxygen Delivery Method): ventilator    O2 Concentration (%): 60        CONSTITUTIONAL:   Ill appearing.  Well nourished.  NAD    ENT:   Airway patent,   Mouth with normal mucosa.   No thrush    EYES:   Pupils equal,   Round and reactive to light.    CARDIAC:   Normal rate,   Regular rhythm.    No edema    RESPIRATORY:   No wheezing  Bilateral BS  Normal chest expansion  Not tachypneic,  No use of accessory muscles    GASTROINTESTINAL:  Abdomen soft,   Non-tender,   No guarding,   + BS    GENITOURINARY  normal genitalia for sex  no edema    MUSCULOSKELETAL:   Range of motion is not limited,  No clubbing, cyanosis    NEUROLOGICAL:   Alert and oriented   No motor  deficits.  pertinent DTRs normal    SKIN:   Skin normal color for race,   Warm and dry  No evidence of rash.    PSYCHIATRIC:   No apparent risk to self or others.        01-24-23 @ 07:01  -  01-25-23 @ 07:00  --------------------------------------------------------  IN:    Enteral Tube Flush: 100 mL    IV PiggyBack: 200 mL    Norepinephrine: 314 mL  Total IN: 614 mL    OUT:    Indwelling Catheter - Urethral (mL): 780 mL  Total OUT: 780 mL    Total NET: -166 mL      01-25-23 @ 07:01  -  01-25-23 @ 08:21  --------------------------------------------------------  IN:    Norepinephrine: 34 mL  Total IN: 34 mL    OUT:    Indwelling Catheter - Urethral (mL): 100 mL  Total OUT: 100 mL    Total NET: -66 mL        LABS:                            9.2    21.02 )-----------( 263      ( 25 Jan 2023 05:39 )             29.4                                               01-25    146  |  111<H>  |  47<H>  ----------------------------<  128<H>  4.9   |  22  |  1.3    Ca    8.3<L>      25 Jan 2023 05:39  Mg     2.6     01-25    TPro  4.8<L>  /  Alb  2.6<L>  /  TBili  0.6  /  DBili  x   /  AST  75<H>  /  ALT  23  /  AlkPhos  88  01-25      PTT - ( 23 Jan 2023 09:28 )  PTT:41.9 sec                                                                                     LIVER FUNCTIONS - ( 25 Jan 2023 05:39 )  Alb: 2.6 g/dL / Pro: 4.8 g/dL / ALK PHOS: 88 U/L / ALT: 23 U/L / AST: 75 U/L / GGT: x                                                  Culture - Urine (collected 22 Jan 2023 14:25)  Source: Catheterized Catheterized  Final Report (24 Jan 2023 05:34):    No growth    Culture - Blood (collected 22 Jan 2023 11:09)  Source: .Blood None  Preliminary Report (23 Jan 2023 22:02):    No growth to date.                                                   Mode: AC/ CMV (Assist Control/ Continuous Mandatory Ventilation)  RR (machine): 20  TV (machine): 400  FiO2: 50  PEEP: 5  MAP: 14  PIP: 25                                      ABG - ( 25 Jan 2023 04:21 )  pH, Arterial: 7.43  pH, Blood: x     /  pCO2: 37    /  pO2: 139   / HCO3: 25    / Base Excess: 0.6   /  SaO2: 99.0          MEDICATIONS  (STANDING):  aspirin  chewable 81 milliGRAM(s) Oral daily  atorvastatin 40 milliGRAM(s) Oral at bedtime  busPIRone 30 milliGRAM(s) Oral every 8 hours  chlorhexidine 0.12% Liquid 15 milliLiter(s) Oral Mucosa every 12 hours  chlorhexidine 2% Cloths 1 Application(s) Topical <User Schedule>  chlorhexidine 4% Liquid 1 Application(s) Topical <User Schedule>  clopidogrel Tablet 300 milliGRAM(s) Oral once  clopidogrel Tablet 75 milliGRAM(s) Oral daily  fentaNYL   Infusion. 0.5 MICROgram(s)/kG/Hr (5.5 mL/Hr) IV Continuous <Continuous>  midazolam Infusion 0.02 mG/kG/Hr (2.2 mL/Hr) IV Continuous <Continuous>  norepinephrine Infusion 0.05 MICROgram(s)/kG/Min (5.16 mL/Hr) IV Continuous <Continuous>  piperacillin/tazobactam IVPB.. 3.375 Gram(s) IV Intermittent every 6 hours  propofol Infusion 5 MICROgram(s)/kG/Min (2.38 mL/Hr) IV Continuous <Continuous>    MEDICATIONS  (PRN):  acetaminophen     Tablet .. 650 milliGRAM(s) Oral every 6 hours PRN Temp greater or equal to 38C (100.4F), Moderate Pain (4 - 6)  sodium chloride 0.9% lock flush 10 milliLiter(s) IV Push every 1 hour PRN Pre/post blood products, medications, blood draw, and to maintain line patency      New X-rays reviewed:                                                                                  ECHO    CXR interpreted by me:       Patient is a 67y old  Female who presents with a chief complaint of CARDIAC ARREST ELEVATED TROPONIN     (23 Jan 2023 19:42)        Over Night Events:  still vented   on levo 0.14   off sedation for more then 48 hrs   only has gas and cough reflex       ROS:     All pertinent ROS are negative except HPI         PHYSICAL EXAM    ICU Vital Signs Last 24 Hrs  T(C): 38.8 (25 Jan 2023 07:01), Max: 39.5 (25 Jan 2023 03:01)  T(F): 101.8 (25 Jan 2023 07:01), Max: 103.1 (25 Jan 2023 03:01)  HR: 55 (25 Jan 2023 07:30) (50 - 59)  ABP: 132/51 (25 Jan 2023 07:30) (94/39 - 161/62)  ABP(mean): 75 (25 Jan 2023 07:30) (56 - 93)  RR: 27 (25 Jan 2023 07:30) (17 - 49)  SpO2: 99% (25 Jan 2023 07:30) (94% - 100%)    O2 Parameters below as of 25 Jan 2023 07:00  Patient On (Oxygen Delivery Method): ventilator    O2 Concentration (%): 60        CONSTITUTIONAL:   Ill appearing.  Well nourished.  NAD    ENT:   Airway patent,   Mouth with normal mucosa.   No thrush    EYES:   Pupils equal,   Round and reactive to light.    CARDIAC:   Normal rate,   Regular rhythm.    No edema    RESPIRATORY:   No wheezing  Bilateral BS  Normal chest expansion  Not tachypneic,  No use of accessory muscles    GASTROINTESTINAL:  Abdomen soft,   Non-tender,   No guarding,   + BS    GENITOURINARY  normal genitalia for sex  no edema    MUSCULOSKELETAL:   Range of motion is not limited,  No clubbing, cyanosis    NEUROLOGICAL:   Alert and oriented   No motor  deficits.  pertinent DTRs normal    SKIN:   Skin normal color for race,   Warm and dry  No evidence of rash.    PSYCHIATRIC:   No apparent risk to self or others.        01-24-23 @ 07:01  -  01-25-23 @ 07:00  --------------------------------------------------------  IN:    Enteral Tube Flush: 100 mL    IV PiggyBack: 200 mL    Norepinephrine: 314 mL  Total IN: 614 mL    OUT:    Indwelling Catheter - Urethral (mL): 780 mL  Total OUT: 780 mL    Total NET: -166 mL      01-25-23 @ 07:01  -  01-25-23 @ 08:21  --------------------------------------------------------  IN:    Norepinephrine: 34 mL  Total IN: 34 mL    OUT:    Indwelling Catheter - Urethral (mL): 100 mL  Total OUT: 100 mL    Total NET: -66 mL        LABS:                            9.2    21.02 )-----------( 263      ( 25 Jan 2023 05:39 )             29.4                                               01-25    146  |  111<H>  |  47<H>  ----------------------------<  128<H>  4.9   |  22  |  1.3    Ca    8.3<L>      25 Jan 2023 05:39  Mg     2.6     01-25    TPro  4.8<L>  /  Alb  2.6<L>  /  TBili  0.6  /  DBili  x   /  AST  75<H>  /  ALT  23  /  AlkPhos  88  01-25      PTT - ( 23 Jan 2023 09:28 )  PTT:41.9 sec                                                                                     LIVER FUNCTIONS - ( 25 Jan 2023 05:39 )  Alb: 2.6 g/dL / Pro: 4.8 g/dL / ALK PHOS: 88 U/L / ALT: 23 U/L / AST: 75 U/L / GGT: x                                                  Culture - Urine (collected 22 Jan 2023 14:25)  Source: Catheterized Catheterized  Final Report (24 Jan 2023 05:34):    No growth    Culture - Blood (collected 22 Jan 2023 11:09)  Source: .Blood None  Preliminary Report (23 Jan 2023 22:02):    No growth to date.                                                   Mode: AC/ CMV (Assist Control/ Continuous Mandatory Ventilation)  RR (machine): 20  TV (machine): 400  FiO2: 50  PEEP: 5  MAP: 14  PIP: 25                                      ABG - ( 25 Jan 2023 04:21 )  pH, Arterial: 7.43  pH, Blood: x     /  pCO2: 37    /  pO2: 139   / HCO3: 25    / Base Excess: 0.6   /  SaO2: 99.0          MEDICATIONS  (STANDING):  aspirin  chewable 81 milliGRAM(s) Oral daily  atorvastatin 40 milliGRAM(s) Oral at bedtime  busPIRone 30 milliGRAM(s) Oral every 8 hours  chlorhexidine 0.12% Liquid 15 milliLiter(s) Oral Mucosa every 12 hours  chlorhexidine 2% Cloths 1 Application(s) Topical <User Schedule>  chlorhexidine 4% Liquid 1 Application(s) Topical <User Schedule>  clopidogrel Tablet 300 milliGRAM(s) Oral once  clopidogrel Tablet 75 milliGRAM(s) Oral daily  fentaNYL   Infusion. 0.5 MICROgram(s)/kG/Hr (5.5 mL/Hr) IV Continuous <Continuous>  midazolam Infusion 0.02 mG/kG/Hr (2.2 mL/Hr) IV Continuous <Continuous>  norepinephrine Infusion 0.05 MICROgram(s)/kG/Min (5.16 mL/Hr) IV Continuous <Continuous>  piperacillin/tazobactam IVPB.. 3.375 Gram(s) IV Intermittent every 6 hours  propofol Infusion 5 MICROgram(s)/kG/Min (2.38 mL/Hr) IV Continuous <Continuous>    MEDICATIONS  (PRN):  acetaminophen     Tablet .. 650 milliGRAM(s) Oral every 6 hours PRN Temp greater or equal to 38C (100.4F), Moderate Pain (4 - 6)  sodium chloride 0.9% lock flush 10 milliLiter(s) IV Push every 1 hour PRN Pre/post blood products, medications, blood draw, and to maintain line patency      New X-rays reviewed:                                                                                  ECHO    CXR interpreted by me:

## 2023-01-25 NOTE — PROGRESS NOTE ADULT - SUBJECTIVE AND OBJECTIVE BOX
Patient is seen and examined at the bed side, is febrile.      REVIEW OF SYSTEMS: All other review systems are negative        ALLERGIES: No Known Allergies        ICU Vital Signs Last 24 Hrs  T(C): 38.9 (25 Jan 2023 15:35), Max: 39.5 (25 Jan 2023 03:01)  T(F): 102 (25 Jan 2023 15:35), Max: 103.1 (25 Jan 2023 03:01)  HR: 57 (25 Jan 2023 15:35) (44 - 66)  BP: 113/50 (25 Jan 2023 12:00) (113/50 - 143/85)  BP(mean): 69 (25 Jan 2023 12:00) (69 - 69)  ABP: 117/50 (25 Jan 2023 15:35) (97/42 - 161/62)  ABP(mean): 70 (25 Jan 2023 15:35) (58 - 93)  RR: 30 (25 Jan 2023 15:35) (20 - 49)  SpO2: 99% (25 Jan 2023 15:35) (94% - 100%)    O2 Parameters below as of 25 Jan 2023 15:35  Patient On (Oxygen Delivery Method): ventilator                PHYSICAL EXAM:  GENERAL: Not in distress   CHEST/LUNG:  Aire ntry bilaterally  HEART: s1 and s2 present  ABDOMEN:  Nontender and  Nondistended  EXTREMITIES: No pedal  edema  CNS: Awake and Alert      LABS:                        9.2    21.02 )-----------( 263      ( 25 Jan 2023 05:39 )             29.4     01-25    146  |  111<H>  |  47<H>  ----------------------------<  128<H>  4.9   |  22  |  1.3    Ca    8.3<L>      25 Jan 2023 05:39  Mg     2.6     01-25    TPro  4.8<L>  /  Alb  2.6<L>  /  TBili  0.6  /  DBili  x   /  AST  75<H>  /  ALT  23  /  AlkPhos  88  01-25        ABG - ( 25 Jan 2023 04:21 )  pH, Arterial: 7.43  pH, Blood: x     /  pCO2: 37    /  pO2: 139   / HCO3: 25    / Base Excess: 0.6   /  SaO2: 99.0          MEDICATIONS  (STANDING):  aspirin  chewable 81 milliGRAM(s) Oral daily  atorvastatin 40 milliGRAM(s) Oral at bedtime  busPIRone 30 milliGRAM(s) Oral every 8 hours  chlorhexidine 0.12% Liquid 15 milliLiter(s) Oral Mucosa every 12 hours  chlorhexidine 2% Cloths 1 Application(s) Topical <User Schedule>  chlorhexidine 4% Liquid 1 Application(s) Topical <User Schedule>  clopidogrel Tablet 300 milliGRAM(s) Oral once  clopidogrel Tablet 75 milliGRAM(s) Oral daily  fentaNYL   Infusion. 0.5 MICROgram(s)/kG/Hr (5.5 mL/Hr) IV Continuous <Continuous>  heparin   Injectable 5000 Unit(s) SubCutaneous every 12 hours  levETIRAcetam  IVPB 500 milliGRAM(s) IV Intermittent every 12 hours  midazolam Infusion 0.02 mG/kG/Hr (2.2 mL/Hr) IV Continuous <Continuous>  norepinephrine Infusion 0.05 MICROgram(s)/kG/Min (5.16 mL/Hr) IV Continuous <Continuous>  piperacillin/tazobactam IVPB.. 3.375 Gram(s) IV Intermittent every 6 hours  polyethylene glycol 3350 17 Gram(s) Oral daily  propofol Infusion 5 MICROgram(s)/kG/Min (2.38 mL/Hr) IV Continuous <Continuous>  senna 2 Tablet(s) Oral at bedtime    MEDICATIONS  (PRN):  acetaminophen     Tablet .. 650 milliGRAM(s) Oral every 6 hours PRN Temp greater or equal to 38C (100.4F), Moderate Pain (4 - 6)  LORazepam   Injectable 1 milliGRAM(s) IV Push every 12 hours PRN Agitation  sodium chloride 0.9% lock flush 10 milliLiter(s) IV Push every 1 hour PRN Pre/post blood products, medications, blood draw, and to maintain line patency          RADIOLOGY & ADDITIONAL TESTS:               Patient is seen and examined at the bed side, is febrile, on cooling blanket. She remains intubated/vented.       REVIEW OF SYSTEMS: Unable to obtain due to mental status      ALLERGIES: No Known Allergies      ICU Vital Signs Last 24 Hrs  T(C): 38.9 (25 Jan 2023 15:35), Max: 39.5 (25 Jan 2023 03:01)  T(F): 102 (25 Jan 2023 15:35), Max: 103.1 (25 Jan 2023 03:01)  HR: 57 (25 Jan 2023 15:35) (44 - 66)  BP: 113/50 (25 Jan 2023 12:00) (113/50 - 143/85)  BP(mean): 69 (25 Jan 2023 12:00) (69 - 69)  ABP: 117/50 (25 Jan 2023 15:35) (97/42 - 161/62)  ABP(mean): 70 (25 Jan 2023 15:35) (58 - 93)  RR: 30 (25 Jan 2023 15:35) (20 - 49)  SpO2: 99% (25 Jan 2023 15:35) (94% - 100%)    O2 Parameters below as of 25 Jan 2023 15:35  Patient On (Oxygen Delivery Method): ventilator        PHYSICAL EXAM:  GENERAL: Intubated/vented  CHEST/LUNG: Not using accessory muscles   HEART: s1 and s2 present  ABDOMEN:  Nontender and  Nondistended  EXTREMITIES: No pedal  edema  CNS: Intubated/vented      LABS:                        9.2    21.02 )-----------( 263      ( 25 Jan 2023 05:39 )             29.4     01-25    146  |  111<H>  |  47<H>  ----------------------------<  128<H>  4.9   |  22  |  1.3    Ca    8.3<L>      25 Jan 2023 05:39  Mg     2.6     01-25    TPro  4.8<L>  /  Alb  2.6<L>  /  TBili  0.6  /  DBili  x   /  AST  75<H>  /  ALT  23  /  AlkPhos  88  01-25        ABG - ( 25 Jan 2023 04:21 )  pH, Arterial: 7.43  pH, Blood: x     /  pCO2: 37    /  pO2: 139   / HCO3: 25    / Base Excess: 0.6   /  SaO2: 99.0          MEDICATIONS  (STANDING):  aspirin  chewable 81 milliGRAM(s) Oral daily  atorvastatin 40 milliGRAM(s) Oral at bedtime  busPIRone 30 milliGRAM(s) Oral every 8 hours  chlorhexidine 0.12% Liquid 15 milliLiter(s) Oral Mucosa every 12 hours  chlorhexidine 2% Cloths 1 Application(s) Topical <User Schedule>  chlorhexidine 4% Liquid 1 Application(s) Topical <User Schedule>  clopidogrel Tablet 300 milliGRAM(s) Oral once  clopidogrel Tablet 75 milliGRAM(s) Oral daily  fentaNYL   Infusion. 0.5 MICROgram(s)/kG/Hr (5.5 mL/Hr) IV Continuous <Continuous>  heparin   Injectable 5000 Unit(s) SubCutaneous every 12 hours  levETIRAcetam  IVPB 500 milliGRAM(s) IV Intermittent every 12 hours  midazolam Infusion 0.02 mG/kG/Hr (2.2 mL/Hr) IV Continuous <Continuous>  norepinephrine Infusion 0.05 MICROgram(s)/kG/Min (5.16 mL/Hr) IV Continuous <Continuous>  piperacillin/tazobactam IVPB.. 3.375 Gram(s) IV Intermittent every 6 hours  polyethylene glycol 3350 17 Gram(s) Oral daily  propofol Infusion 5 MICROgram(s)/kG/Min (2.38 mL/Hr) IV Continuous <Continuous>  senna 2 Tablet(s) Oral at bedtime    MEDICATIONS  (PRN):  acetaminophen     Tablet .. 650 milliGRAM(s) Oral every 6 hours PRN Temp greater or equal to 38C (100.4F), Moderate Pain (4 - 6)  LORazepam   Injectable 1 milliGRAM(s) IV Push every 12 hours PRN Agitation  sodium chloride 0.9% lock flush 10 milliLiter(s) IV Push every 1 hour PRN Pre/post blood products, medications, blood draw, and to maintain line patency        RADIOLOGY & ADDITIONAL TESTS:    < from: CT Head No Cont (01.25.23 @ 12:03) >  Since the prior head CT 1/20/2023:    Interval development of diffuse cerebral edema and diffuse loss of   gray-white differentiation consistent with global hypoxic ischemic injury.      < from: Xray Chest 1 View- PORTABLE-Urgent (Xray Chest 1 View- PORTABLE-Urgent .) (01.25.23 @ 10:24) >  Support devices: ETT with its tip above the dave, NGT with its tip   below the diaphragm and a right IJ line with its tip overlying the SVC   and transvenous pacemaker wire overlying the right atrium.    Cardiac/mediastinum/hilum: Stable    Lung parenchyma/Pleura: Bibasilar opacities, unchanged. No pneumothorax   is seen.          MICROBIOLOGY DATA:    Culture - Urine (01.22.23 @ 14:25)   Specimen Source: Catheterized Catheterized   Culture Results: No growth     Culture - Blood (01.22.23 @ 11:09)   Specimen Source: .Blood None   Culture Results: No growth to date.     MRSA/MSSA PCR (01.20.23 @ 16:55)   MRSA PCR Result.: Negative:     Respiratory Viral Panel with COVID-19 by ADELAIDA (01.20.23 @ 16:55)   Rapid RVP Result: Detected   SARS-CoV-2: NotDetec:

## 2023-01-25 NOTE — CHART NOTE - NSCHARTNOTEFT_GEN_A_CORE
Electrophysiology PA Note     chart reviewed  still no improvement  poor prognosis  please recall EP when pt improves   [id number]  spoke with CCU resident

## 2023-01-25 NOTE — PROGRESS NOTE ADULT - SUBJECTIVE AND OBJECTIVE BOX
Patient is a 67y old  Female who presents with a chief complaint of CARDIAC ARREST ELEVATED TROPONIN     (23 Jan 2023 19:42)      T(F): 101.8 (01-25-23 @ 07:01), Max: 103.1 (01-25-23 @ 03:01)  HR: 51 (01-25-23 @ 06:30)  BP: --  RR: 23 (01-25-23 @ 07:01)  SpO2: 99% (01-25-23 @ 06:30) (94% - 100%)    PHYSICAL EXAM:  GENERAL: NAD, well-groomed, well-developed  HEAD:  Atraumatic, Normocephalic  EYES: EOMI, PERRLA, conjunctiva and sclera clear  ENMT: No tonsillar erythema, exudates, or enlargement; Moist mucous membranes, Good dentition, No lesions  NECK: Supple, No JVD, Normal thyroid  NERVOUS SYSTEM:  Alert & Oriented X3,  Motor Strength 5/5 B/L upper and lower extremities  CHEST/LUNG: Clear to percussion bilaterally; No rales, rhonchi, wheezing, or rubs  HEART: Regular rate and rhythm; No murmurs, rubs, or gallops  ABDOMEN: Soft, Nontender, Nondistended; Bowel sounds present  EXTREMITIES:   No clubbing, cyanosis, or edema  LYMPH: No lymphadenopathy noted  SKIN: No rashes or lesions    labs  01-25    146  |  111<H>  |  47<H>  ----------------------------<  128<H>  4.9   |  22  |  1.3    Ca    8.3<L>      25 Jan 2023 05:39  Mg     2.6     01-25    TPro  4.8<L>  /  Alb  2.6<L>  /  TBili  0.6  /  DBili  x   /  AST  75<H>  /  ALT  23  /  AlkPhos  88  01-25                          9.2    21.02 )-----------( x        ( 25 Jan 2023 05:39 )             29.4       Culture - Urine (collected 22 Jan 2023 14:25)  Source: Catheterized Catheterized  Final Report (24 Jan 2023 05:34):    No growth    Culture - Blood (collected 22 Jan 2023 11:09)  Source: .Blood None  Preliminary Report (23 Jan 2023 22:02):    No growth to date.      PTT - ( 23 Jan 2023 09:28 )  PTT:41.9 sec  Mode: AC/ CMV (Assist Control/ Continuous Mandatory Ventilation)  RR (machine): 20  TV (machine): 400  FiO2: 50  PEEP: 5  MAP: 14  PIP: 25        acetaminophen     Tablet .. 650 milliGRAM(s) Oral every 6 hours PRN  aspirin  chewable 81 milliGRAM(s) Oral daily  atorvastatin 40 milliGRAM(s) Oral at bedtime  busPIRone 30 milliGRAM(s) Oral every 8 hours  chlorhexidine 0.12% Liquid 15 milliLiter(s) Oral Mucosa every 12 hours  chlorhexidine 2% Cloths 1 Application(s) Topical <User Schedule>  chlorhexidine 4% Liquid 1 Application(s) Topical <User Schedule>  clopidogrel Tablet 300 milliGRAM(s) Oral once  clopidogrel Tablet 75 milliGRAM(s) Oral daily  fentaNYL   Infusion. 0.5 MICROgram(s)/kG/Hr IV Continuous <Continuous>  midazolam Infusion 0.02 mG/kG/Hr IV Continuous <Continuous>  norepinephrine Infusion 0.05 MICROgram(s)/kG/Min IV Continuous <Continuous>  piperacillin/tazobactam IVPB.. 3.375 Gram(s) IV Intermittent every 6 hours  propofol Infusion 5 MICROgram(s)/kG/Min IV Continuous <Continuous>  sodium chloride 0.9% lock flush 10 milliLiter(s) IV Push every 1 hour PRN

## 2023-01-25 NOTE — PROGRESS NOTE ADULT - ASSESSMENT
A 68 y/o  F with no past medical history (does not see doctors) was found by   unresponsive - called EMS who found the pt pulseless and coded pt ~45min s/p 10 shocks then ROSC. VEEG showed moderate sharps and CT head showed diffuse edema suggestive of anoxic brain injury, Neuron specific enolase is elevated.     - Start Keppra 500 mg BID   - NA level:    - Prognosis of functional recovery is guarded   - Brain MRI w/o once stable

## 2023-01-26 NOTE — DISCHARGE NOTE FOR THE EXPIRED PATIENT - HOSPITAL COURSE
67-year-old female no known medical history brought in by EMS S/p Cardio pulmonary arrest  x45min, 13 rounds of epi, 10 shocks. ROSC achieved, patient intubated in the field.  Patient had PPM placed by Cardiology due to episode of bradycardia/ asystole. Pt remained intubated while in CCU. Patient was followed by Neurology, had Ct head done which showed global hypoxic ischemic injury. Family decided on CMO. Patient was terminally extubated, and subsequently pronounced dead due to cardiopulmonary arrest.

## 2023-01-26 NOTE — PROGRESS NOTE ADULT - ASSESSMENT
IMPRESSION:  Acute hypoxemic respiratory failure  S/p Cardio pulmonary arrest  x45min, 13 rounds of epi, 10 shocks  Prolonged down time in field without compressions prior to EMS arrival  Episode of asystole and bradycardia on 01/21, s/p PPM placement, set at rate of 60  Multifocal pneumonia possible aspiration   Acute respiratory distress syndrome  Sepsis present on admission  Likely anoxic brain injury  Enterovirus     PLAN:    CNS: EEG negative for seizures. Neuro following.  Neuron-specific enolase elevated. FU repeat CT Head.     HEENT: Oral care    PULMONARY:  HOB @ 45 degrees. Aspiration precautions Vent changes:  lower FIo2 to 40. SBT when wake up     CARDIOVASCULAR: ECHO wnl. Troponin downtrending. Cardio following.  Goal directed fluid resuscitation.   follow cardiology   GI: GI prophylaxis. feed 10cc/ hr   bowel regimen     RENAL:  Follow up lytes.  Correct as needed. Ray.     INFECTIOUS DISEASE: Cultures negative to date. Procal elevated. FU Fungitell. Nasal MRSA negative. Urine strep/legionella negative. Start zosyn for now. DC rocephin and flagyl. ID FU.   ?? central fever   nasal mrsa   start vanco   fungetil     HEMATOLOGICAL: LE venous duplex negative. heprain SQ     ENDOCRINE:  Follow up FS. Target -180. FU TSH.     MUSCULOSKELETAL: bedrest    LINES  L radial a-line    MICU  very poor prognosis   DNR  case discussed with family   discus with family about organ donation eval they refused  IMPRESSION:  Acute hypoxemic respiratory failure  S/p Cardio pulmonary arrest  x45min, 13 rounds of epi, 10 shocks  Prolonged down time in field without compressions prior to EMS arrival  Episode of asystole and bradycardia on 01/21, s/p PPM placement, set at rate of 60  Multifocal pneumonia possible aspiration   Acute respiratory distress syndrome  Sepsis present on admission  Likely anoxic brain injury  Enterovirus     PLAN:    CNS: EEG negative for seizures. Neuro following.  Neuron-specific enolase elevated. Repeat CT Head consistent with global hypoxic ischemic injury.    HEENT: Oral care    PULMONARY:  HOB @ 45 degrees. Aspiration precautions. Vent changes: None. Family decided on palliative extubation.     CARDIOVASCULAR: ECHO wnl. Troponin downtrending. Cardio following.      GI: GI prophylaxis. DC OG feeds, DC OG tube.     RENAL:  Follow up lytes. Correct as needed. DC Ray.     INFECTIOUS DISEASE: Cultures negative to date. Procal elevated. FU Fungitell. Nasal MRSA negative. Urine strep/legionella negative. ID following.     HEMATOLOGICAL: DC blood draws.     ENDOCRINE: DC FS.    MUSCULOSKELETAL: bedrest    LINES  L radial a-line    MICU  very poor prognosis   DNR  case discussed with family   discus with family about organ donation eval they refused   GOC done with family, overall extremely poor prognosis, family would like to have palliative extubation and understand risks IMPRESSION:  Acute hypoxemic respiratory failure  S/p Cardio pulmonary arrest  x45min, 13 rounds of epi, 10 shocks  Prolonged down time in field without compressions prior to EMS arrival  Episode of asystole and bradycardia on 01/21, s/p PPM placement, set at rate of 60  Multifocal pneumonia possible aspiration   Acute respiratory distress syndrome  Sepsis present on admission  Likely anoxic brain injury  Enterovirus     PLAN:    CNS: EEG negative for seizures. Neuro following.  Neuron-specific enolase elevated. Repeat CT Head consistent with global hypoxic ischemic injury.    HEENT: Oral care    PULMONARY:  HOB @ 45 degrees. Aspiration precautions. Vent changes: None. Family decided on palliative extubation.     CARDIOVASCULAR: ECHO wnl. Troponin downtrending. Cardio following.      GI: GI prophylaxis. DC OG feeds, DC OG tube.     RENAL:  Follow up lytes. Correct as needed. DC Ray.     INFECTIOUS DISEASE: Cultures negative to date. Procal elevated. FU Fungitell. Nasal MRSA negative. Urine strep/legionella negative. ID following.     HEMATOLOGICAL: DC blood draws.     ENDOCRINE: DC FS.    MUSCULOSKELETAL: bedrest    LINES  L radial a-line    MICU  very poor prognosis   DNR  GOC done with family, overall extremely poor prognosis, family would like to have palliative extubation and understand risks

## 2023-01-26 NOTE — CHART NOTE - NSCHARTNOTEFT_GEN_A_CORE
Palliative care chart note    67 year old woman without known medical history presents after cardiac arrest. Hospital course complicated by respiratory failure and ARDS/pneumonia requiring intubation, likely anoxic brain injury, sepsis present on admission. Family wishes for palliative extubation and comfort measures. Palliative care consulted for symptom management for palliative extubation.    Per discussion with Dr. Gonsalves, patient is no longer on midazolam and fentanyl infusions. Plan was to start a morphine infusion for extubation. She is not symptomatic per discussion with Dr. Gonsalves.    Recommendations for symptom management  -DNR/DNI and comfort measures only with palliative extubation per family discussion with primary team  -Primary team to fill out MOLST per their discussion with family  -Do not recommend morphine infusion unless patient demonstrates need for infusion with PRN opioid use  -recommend morphine 4mg IV once 5-10 minutes prior to extubation  -recommend ativan 0.5mg IV once 5-10 minutes prior to extubation  -recommend morphine 4mg q15min PRN for pain/dyspnea following extubation  -recommend ativan 0.5mg IV q15min PRN for agitation/anxiety following extubation  -recommend glycopyrrolate 0.2mg IV q6h PRN For secretions following extubation    Please call [phone] PRN if patient is symptomatic despite meds above Palliative care chart note    67 year old woman without known medical history presents after cardiac arrest. Hospital course complicated by respiratory failure and ARDS/pneumonia requiring intubation, likely anoxic brain injury, sepsis present on admission. Family wishes for palliative extubation and comfort measures. Palliative care consulted for symptom management for palliative extubation.    Per discussion with Dr. Gonsalves, patient is no longer on midazolam and fentanyl infusions. Plan was to start a morphine infusion for extubation. She is not symptomatic per discussion with Dr. Gonsalves.    Recommendations for symptom management  -DNR/DNI and comfort measures only with palliative extubation per family discussion with primary team  -Primary team to fill out MOLST per their discussion with family  -Do not recommend morphine infusion unless patient demonstrates need for infusion with PRN opioid use  -recommend morphine 4mg IV once 5-10 minutes prior to extubation  -recommend ativan 0.5mg IV once 5-10 minutes prior to extubation  -recommend morphine 4mg IV q15min PRN for pain/dyspnea following extubation  -recommend ativan 0.5mg IV q15min PRN for agitation/anxiety following extubation  -recommend glycopyrrolate 0.2mg IV q6h PRN For secretions following extubation    Please call [phone] PRN if patient is symptomatic despite meds above

## 2023-01-26 NOTE — PROGRESS NOTE ADULT - SUBJECTIVE AND OBJECTIVE BOX
Patient is a 67y old  Female who presents with a chief complaint of CARDIAC ARREST ELEVATED TROPONIN     (23 Jan 2023 19:42)      T(F): 101.5 (01-26-23 @ 07:01), Max: 103.3 (01-26-23 @ 03:00)  HR: 41 (01-26-23 @ 06:30)  BP: 123/57 (01-26-23 @ 05:30)  RR: 24 (01-26-23 @ 07:01)  SpO2: 98% (01-26-23 @ 06:30) (90% - 100%)    PHYSICAL EXAM:  GENERAL: NAD, well-groomed, well-developed  HEAD:  Atraumatic, Normocephalic  EYES: EOMI, PERRLA, conjunctiva and sclera clear  ENMT: No tonsillar erythema, exudates, or enlargement; Moist mucous membranes, Good dentition, No lesions  NECK: Supple, No JVD, Normal thyroid  NERVOUS SYSTEM:  Alert & Oriented X3,  Motor Strength 5/5 B/L upper and lower extremities  CHEST/LUNG: Clear to percussion bilaterally; No rales, rhonchi, wheezing, or rubs  HEART: Regular rate and rhythm; No murmurs, rubs, or gallops  ABDOMEN: Soft, Nontender, Nondistended; Bowel sounds present  EXTREMITIES:   No clubbing, cyanosis, or edema  LYMPH: No lymphadenopathy noted  SKIN: No rashes or lesions    labs  01-26    146  |  113<H>  |  43<H>  ----------------------------<  134<H>  4.6   |  23  |  1.2    Ca    7.6<L>      26 Jan 2023 05:56  Mg     2.6     01-26    TPro  4.5<L>  /  Alb  2.6<L>  /  TBili  0.6  /  DBili  x   /  AST  63<H>  /  ALT  20  /  AlkPhos  81  01-26                          8.1    13.56 )-----------( 205      ( 26 Jan 2023 05:56 )             26.0         Mode: AC/ CMV (Assist Control/ Continuous Mandatory Ventilation)  RR (machine): 20  TV (machine): 400  FiO2: 40  PEEP: 5  ITime: 0.69  MAP: 12  PIP: 26        acetaminophen     Tablet .. 650 milliGRAM(s) Oral every 6 hours PRN  aspirin  chewable 81 milliGRAM(s) Oral daily  atorvastatin 40 milliGRAM(s) Oral at bedtime  busPIRone 30 milliGRAM(s) Oral every 8 hours  chlorhexidine 0.12% Liquid 15 milliLiter(s) Oral Mucosa every 12 hours  chlorhexidine 2% Cloths 1 Application(s) Topical <User Schedule>  chlorhexidine 4% Liquid 1 Application(s) Topical <User Schedule>  clopidogrel Tablet 300 milliGRAM(s) Oral once  clopidogrel Tablet 75 milliGRAM(s) Oral daily  fentaNYL   Infusion. 0.5 MICROgram(s)/kG/Hr IV Continuous <Continuous>  heparin   Injectable 5000 Unit(s) SubCutaneous every 12 hours  levETIRAcetam  IVPB 500 milliGRAM(s) IV Intermittent every 12 hours  LORazepam   Injectable 1 milliGRAM(s) IV Push every 12 hours PRN  midazolam Infusion 0.02 mG/kG/Hr IV Continuous <Continuous>  norepinephrine Infusion 0.05 MICROgram(s)/kG/Min IV Continuous <Continuous>  piperacillin/tazobactam IVPB.. 3.375 Gram(s) IV Intermittent every 6 hours  polyethylene glycol 3350 17 Gram(s) Oral daily  propofol Infusion 10 MICROgram(s)/kG/Min IV Continuous <Continuous>  senna 2 Tablet(s) Oral at bedtime  sodium chloride 0.9% lock flush 10 milliLiter(s) IV Push every 1 hour PRN

## 2023-01-26 NOTE — PROVIDER CONTACT NOTE (CHANGE IN STATUS NOTIFICATION) - SITUATION
NP Tyli aware pt Cece is positional, unable to get a good waveform and over dampened at times. Using cuff pressures to titrate levophed. No interventions with Cece per NP. Also aware pt TVP dressing requires reinforcement- NP does not feel comfortable changing dressing. Will continue to monitor.
Notified pt HR 50-60's not showing pacing spikes, prior RN stated pt only paces when below 50HR. Pt rate is irregular, with pauses and missing complexes. JULIAN Navas is aware of patients status and situation, as well as vitals. No interventions at this time. PA aware pt has transvenous pacemaker
Notified pt is still not pacing, HR now 30-40's, alarming. No interventions as per MD Thorpe. BP stable via Cece- HR via [place] 40's. Pt has + pulses. Directed to notify MD and PA with changes. JULIAN Navas was at bedside earlier to assess pt- did not want to change pacemaker settings.
MD Pleitez made aware that patient appears to be shivering.  Rectal temp 102.

## 2023-01-26 NOTE — PROGRESS NOTE ADULT - ATTENDING COMMENTS
patient seen and examined agree above note   continue abx    add vanco   follow cx   follow ID   keep off sedation   follow EEG     cardiology follow up   pacemaker transvenous
patient seen and examined agree above note   continue abx   follow cx   follow ID   keep off sedation   follow EEG     cardiology follow up   pacemaker transvenous
patient seen and examined agree above note   follow palliative for terminal wean as per family request   organ donation informed by icu team   case discussed with daughter at bed side and whole family regarding the poor prognosis and about result of ct scan
patient seen and examined agree above note   continue abx   follow cx   follow ID   do SAT to assess mental status   follow EEG   cardiology follow up

## 2023-01-26 NOTE — PROGRESS NOTE ADULT - SUBJECTIVE AND OBJECTIVE BOX
Patient seen and evaluated this am, sedated on ventilator with propofol        T(F): 102.6 (01-26-23 @ 11:00), Max: 103.3 (01-26-23 @ 03:00)  HR: 48 (01-26-23 @ 09:38)  BP: 123/57 (01-26-23 @ 05:30)  RR: 26/20  SpO2: 95% (01-26-23 @ 09:38) (90% - 100%)    PHYSICAL EXAM:  GENERAL: NAD  HEAD:  Atraumatic, Normocephalic  EYES: EOMI, PERRLA, conjunctiva and sclera clear  NERVOUS SYSTEM: positive gag   CHEST/LUNG:  bilateral  rales  HEART: Regular rate and rhythm; No murmurs, rubs, or gallops  ABDOMEN: Soft, Nontender, Nondistended; Bowel sounds present  EXTREMITIES:  2+ Peripheral Pulses, No clubbing, cyanosis, or edema    LABS  01-26    146  |  113<H>  |  43<H>  ----------------------------<  134<H>  4.6   |  23  |  1.2    Ca    7.6<L>      26 Jan 2023 05:56  Mg     2.6     01-26    TPro  4.5<L>  /  Alb  2.6<L>  /  TBili  0.6  /  DBili  x   /  AST  63<H>  /  ALT  20  /  AlkPhos  81  01-26                          8.1    13.56 )-----------( 205      ( 26 Jan 2023 05:56 )             26.0       Mode: AC/ CMV (Assist Control/ Continuous Mandatory Ventilation)  RR (machine): 20  TV (machine): 400  FiO2: 40  PEEP: 5    < from: TTE Echo Complete w/o Contrast w/ Doppler (01.20.23 @ 14:39) >  Summary:   1. Apical septal segment, apical inferior segment, and apex are abnormal   as described above.   2. LV Ejection Fraction by Sepulveda's Method with a biplane EF of 45 %.   3. Normal left atrial size.   4. There is no evidence of pericardial effusion.   5. Mild mitral annular calcification.   6. Mild mitral valve regurgitation.   7. There is mild aortic root calcification.    PHYSICIAN INTERPRETATION:  Left Ventricle: The left ventricle was not well visualized. The left   ventricular internal cavity size is normal. Left ventricular wall   thickness is normal.      < end of copied text >      Culture Results:   No growth (01-22-23)  Culture Results:   No growth to date. (01-22-23)  Culture Results:   Rare Streptococcus mitis/oralis group  Rare Streptococcus anginosus  "Susceptibilities not performed" (01-20-23)  Culture Results:   No growth (01-20-23)  Culture Results:   No Growth Final (01-20-23)  Culture Results:   No Growth Final (01-20-23)    RADIOLOGY  < from: Xray Chest 1 View- PORTABLE-Routine (Xray Chest 1 View- PORTABLE-Routine in AM.) (01.26.23 @ 06:04) >  Impression:    Bibasilar opacities, unchanged.      < end of copied text >  < from: CT Head No Cont (01.25.23 @ 12:03) >  IMPRESSION:  Since the prior head CT 1/20/2023:    Interval development of diffuse cerebral edema and diffuse loss of   gray-white differentiation consistent with global hypoxic ischemic injury.      < end of copied text >    MEDICATIONS  (STANDING):  aspirin  chewable 81 milliGRAM(s) Oral daily  atorvastatin 40 milliGRAM(s) Oral at bedtime  busPIRone 30 milliGRAM(s) Oral every 8 hours  chlorhexidine 0.12% Liquid 15 milliLiter(s) Oral Mucosa every 12 hours  chlorhexidine 2% Cloths 1 Application(s) Topical <User Schedule>  chlorhexidine 4% Liquid 1 Application(s) Topical <User Schedule>  clopidogrel Tablet 300 milliGRAM(s) Oral once  clopidogrel Tablet 75 milliGRAM(s) Oral daily  fentaNYL   Infusion. 0.5 MICROgram(s)/kG/Hr (5.5 mL/Hr) IV Continuous <Continuous>  heparin   Injectable 5000 Unit(s) SubCutaneous every 12 hours  levETIRAcetam  IVPB 500 milliGRAM(s) IV Intermittent every 12 hours  LORazepam   Injectable 2 milliGRAM(s) IV Push onc  norepinephrine Infusion 0.05 MICROgram(s)/kG/Min (5.16 mL/Hr) IV Continuous <Continuous>  piperacillin/tazobactam IVPB.. 3.375 Gram(s) IV Intermittent every 6 hours  polyethylene glycol 3350 17 Gram(s) Oral daily  propofol Infusion 10 MICROgram(s)/kG/Min (6.6 mL/Hr) IV Continuous <Continuous>  senna 2 Tablet(s) Oral at bedtime    MEDICATIONS  (PRN):  acetaminophen     Tablet .. 650 milliGRAM(s) Oral every 6 hours PRN Temp greater or equal to 38C (100.4F), Moderate Pain (4 - 6)  LORazepam   Injectable 1 milliGRAM(s) IV Push every 12 hours PRN Agitation  sodium chloride 0.9% lock flush 10 milliLiter(s) IV Push every 1 hour PRN Pre/post blood products, medications, blood draw, and to maintain line patency

## 2023-01-26 NOTE — PROGRESS NOTE ADULT - PROVIDER SPECIALTY LIST ADULT
Critical Care
Cardiology
Critical Care
Hospitalist
Infectious Disease
Infectious Disease
Internal Medicine
Internal Medicine
Neurology
Pulmonology
Cardiology
Hospitalist
Infectious Disease
Internal Medicine
Pulmonology

## 2023-01-26 NOTE — PROGRESS NOTE ADULT - ASSESSMENT
Patient responds to pain.  On  dose pressors.  Pacemaker  Threshold .6 MA. Rate set 50 BPM MA of 3.5. Check cultures. Continue support. Appreciate ct scan head.  Need define goals of care.  H/H decreasing . Need follow. Transfuse if needed.  Prognosis poor

## 2023-01-26 NOTE — PROGRESS NOTE ADULT - SUBJECTIVE AND OBJECTIVE BOX
Patient is a 67y old  Female who presents with a chief complaint of CARDIAC ARREST ELEVATED TROPONIN     (23 Jan 2023 19:42)        Over Night Events:  Remains on mechanical ventilation.       ROS:     All pertinent ROS are negative except HPI         PHYSICAL EXAM    ICU Vital Signs Last 24 Hrs  T(C): 38.6 (26 Jan 2023 07:01), Max: 39.6 (26 Jan 2023 03:00)  T(F): 101.5 (26 Jan 2023 07:01), Max: 103.3 (26 Jan 2023 03:00)  HR: 49 (26 Jan 2023 07:30) (41 - 66)  BP: 123/57 (26 Jan 2023 05:30) (106/50 - 143/85)  BP(mean): 82 (26 Jan 2023 05:30) (69 - 83)  ABP: 109/45 (26 Jan 2023 07:30) (73/50 - 153/60)  ABP(mean): 65 (26 Jan 2023 07:30) (57 - 108)  RR: 28 (26 Jan 2023 07:30) (21 - 52)  SpO2: 98% (26 Jan 2023 07:30) (90% - 100%)    O2 Parameters below as of 25 Jan 2023 21:00  Patient On (Oxygen Delivery Method): ventilator            CONSTITUTIONAL:   Ill appearing. NAD    ENT:   Airway patent,   Mouth with normal mucosa.   No thrush    EYES:   Pupils equal,   Round and reactive to light.    CARDIAC:   Normal rate,   Regular rhythm.    No edema    RESPIRATORY:   Decreased bilateral BS  Normal chest expansion  Not tachypneic,  No use of accessory muscles    GASTROINTESTINAL:  Abdomen soft,   Non-tender,   No guarding,   + BS    MUSCULOSKELETAL:   Range of motion is not limited,  No clubbing, cyanosis    NEUROLOGICAL:   Alert and oriented   No motor  deficits.  pertinent DTRs normal    SKIN:   Skin normal color for race,   Warm and dry  No evidence of rash.    PSYCHIATRIC:   No apparent risk to self or others.        01-25-23 @ 07:01  -  01-26-23 @ 07:00  --------------------------------------------------------  IN:    Enteral Tube Flush: 100 mL    IV PiggyBack: 250 mL    IV PiggyBack: 100 mL    Norepinephrine: 322 mL    Peptamen A.F.: 800 mL    Propofol: 45 mL    Propofol: 2175 mL  Total IN: 3792 mL    OUT:    Indwelling Catheter - Urethral (mL): 1205 mL  Total OUT: 1205 mL    Total NET: 2587 mL      01-26-23 @ 07:01  -  01-26-23 @ 08:25  --------------------------------------------------------  IN:  Total IN: 0 mL    OUT:    Indwelling Catheter - Urethral (mL): 100 mL  Total OUT: 100 mL    Total NET: -100 mL        LABS:                            8.1    13.56 )-----------( 205      ( 26 Jan 2023 05:56 )             26.0                                               01-26    146  |  113<H>  |  43<H>  ----------------------------<  134<H>  4.6   |  23  |  1.2    Ca    7.6<L>      26 Jan 2023 05:56  Mg     2.6     01-26    TPro  4.5<L>  /  Alb  2.6<L>  /  TBili  0.6  /  DBili  x   /  AST  63<H>  /  ALT  20  /  AlkPhos  81  01-26                                                                                       LIVER FUNCTIONS - ( 26 Jan 2023 05:56 )  Alb: 2.6 g/dL / Pro: 4.5 g/dL / ALK PHOS: 81 U/L / ALT: 20 U/L / AST: 63 U/L / GGT: x                                                                                               Mode: AC/ CMV (Assist Control/ Continuous Mandatory Ventilation)  RR (machine): 20  TV (machine): 400  FiO2: 40  PEEP: 5  ITime: 0.69  MAP: 12  PIP: 26                                      ABG - ( 26 Jan 2023 02:30 )  pH, Arterial: 7.49  pH, Blood: x     /  pCO2: 29    /  pO2: 106   / HCO3: 22    / Base Excess: -1.0  /  SaO2: 99.3          MEDICATIONS  (STANDING):  aspirin  chewable 81 milliGRAM(s) Oral daily  atorvastatin 40 milliGRAM(s) Oral at bedtime  busPIRone 30 milliGRAM(s) Oral every 8 hours  chlorhexidine 0.12% Liquid 15 milliLiter(s) Oral Mucosa every 12 hours  chlorhexidine 2% Cloths 1 Application(s) Topical <User Schedule>  chlorhexidine 4% Liquid 1 Application(s) Topical <User Schedule>  clopidogrel Tablet 300 milliGRAM(s) Oral once  clopidogrel Tablet 75 milliGRAM(s) Oral daily  fentaNYL   Infusion. 0.5 MICROgram(s)/kG/Hr (5.5 mL/Hr) IV Continuous <Continuous>  heparin   Injectable 5000 Unit(s) SubCutaneous every 12 hours  levETIRAcetam  IVPB 500 milliGRAM(s) IV Intermittent every 12 hours  midazolam Infusion 0.02 mG/kG/Hr (2.2 mL/Hr) IV Continuous <Continuous>  norepinephrine Infusion 0.05 MICROgram(s)/kG/Min (5.16 mL/Hr) IV Continuous <Continuous>  piperacillin/tazobactam IVPB.. 3.375 Gram(s) IV Intermittent every 6 hours  polyethylene glycol 3350 17 Gram(s) Oral daily  propofol Infusion 10 MICROgram(s)/kG/Min (6.6 mL/Hr) IV Continuous <Continuous>  senna 2 Tablet(s) Oral at bedtime    MEDICATIONS  (PRN):  acetaminophen     Tablet .. 650 milliGRAM(s) Oral every 6 hours PRN Temp greater or equal to 38C (100.4F), Moderate Pain (4 - 6)  LORazepam   Injectable 1 milliGRAM(s) IV Push every 12 hours PRN Agitation  sodium chloride 0.9% lock flush 10 milliLiter(s) IV Push every 1 hour PRN Pre/post blood products, medications, blood draw, and to maintain line patency      New X-rays reviewed:                                                                                  ECHO    CXR interpreted by me:       Patient is a 67y old  Female who presents with a chief complaint of CARDIAC ARREST ELEVATED TROPONIN     (23 Jan 2023 19:42)        Over Night Events:  Remains on mechanical ventilation.     Drips  Levophed- 0.15  Off sedation, was on propofol        ROS:     All pertinent ROS are negative except HPI         PHYSICAL EXAM    ICU Vital Signs Last 24 Hrs  T(C): 38.6 (26 Jan 2023 07:01), Max: 39.6 (26 Jan 2023 03:00)  T(F): 101.5 (26 Jan 2023 07:01), Max: 103.3 (26 Jan 2023 03:00)  HR: 49 (26 Jan 2023 07:30) (41 - 66)  BP: 123/57 (26 Jan 2023 05:30) (106/50 - 143/85)  BP(mean): 82 (26 Jan 2023 05:30) (69 - 83)  ABP: 109/45 (26 Jan 2023 07:30) (73/50 - 153/60)  ABP(mean): 65 (26 Jan 2023 07:30) (57 - 108)  RR: 28 (26 Jan 2023 07:30) (21 - 52)  SpO2: 98% (26 Jan 2023 07:30) (90% - 100%)    O2 Parameters below as of 25 Jan 2023 21:00  Patient On (Oxygen Delivery Method): ventilator        CONSTITUTIONAL:   Ill appearing. NAD    ENT:   Airway patent,   Mouth with normal mucosa.   No thrush    EYES:   Pupils equal,   Round and reactive to light.    CARDIAC:   Normal rate,   Regular rhythm.    No edema    RESPIRATORY:   Decreased bilateral BS  Normal chest expansion  Not tachypneic,  No use of accessory muscles    GASTROINTESTINAL:  OGT  Abdomen soft,   Non-tender,   No guarding,   + BS    MUSCULOSKELETAL:   Range of motion is not limited,  No clubbing, cyanosis    NEUROLOGICAL:   +gag, +corneal  Doesn't follow commands    SKIN:   Skin normal color for race,   Warm and dry  No evidence of rash.    PSYCHIATRIC:   No apparent risk to self or others.        01-25-23 @ 07:01  -  01-26-23 @ 07:00  --------------------------------------------------------  IN:    Enteral Tube Flush: 100 mL    IV PiggyBack: 250 mL    IV PiggyBack: 100 mL    Norepinephrine: 322 mL    Peptamen A.F.: 800 mL    Propofol: 45 mL    Propofol: 2175 mL  Total IN: 3792 mL    OUT:    Indwelling Catheter - Urethral (mL): 1205 mL  Total OUT: 1205 mL    Total NET: 2587 mL      01-26-23 @ 07:01  -  01-26-23 @ 08:25  --------------------------------------------------------  IN:  Total IN: 0 mL    OUT:    Indwelling Catheter - Urethral (mL): 100 mL  Total OUT: 100 mL    Total NET: -100 mL        LABS:                            8.1    13.56 )-----------( 205      ( 26 Jan 2023 05:56 )             26.0                                               01-26    146  |  113<H>  |  43<H>  ----------------------------<  134<H>  4.6   |  23  |  1.2    Ca    7.6<L>      26 Jan 2023 05:56  Mg     2.6     01-26    TPro  4.5<L>  /  Alb  2.6<L>  /  TBili  0.6  /  DBili  x   /  AST  63<H>  /  ALT  20  /  AlkPhos  81  01-26                                                                                       LIVER FUNCTIONS - ( 26 Jan 2023 05:56 )  Alb: 2.6 g/dL / Pro: 4.5 g/dL / ALK PHOS: 81 U/L / ALT: 20 U/L / AST: 63 U/L / GGT: x                                                                                               Mode: AC/ CMV (Assist Control/ Continuous Mandatory Ventilation)  RR (machine): 20  TV (machine): 400  FiO2: 40  PEEP: 5  ITime: 0.69  MAP: 12  PIP: 26                                      ABG - ( 26 Jan 2023 02:30 )  pH, Arterial: 7.49  pH, Blood: x     /  pCO2: 29    /  pO2: 106   / HCO3: 22    / Base Excess: -1.0  /  SaO2: 99.3          MEDICATIONS  (STANDING):  aspirin  chewable 81 milliGRAM(s) Oral daily  atorvastatin 40 milliGRAM(s) Oral at bedtime  busPIRone 30 milliGRAM(s) Oral every 8 hours  chlorhexidine 0.12% Liquid 15 milliLiter(s) Oral Mucosa every 12 hours  chlorhexidine 2% Cloths 1 Application(s) Topical <User Schedule>  chlorhexidine 4% Liquid 1 Application(s) Topical <User Schedule>  clopidogrel Tablet 300 milliGRAM(s) Oral once  clopidogrel Tablet 75 milliGRAM(s) Oral daily  fentaNYL   Infusion. 0.5 MICROgram(s)/kG/Hr (5.5 mL/Hr) IV Continuous <Continuous>  heparin   Injectable 5000 Unit(s) SubCutaneous every 12 hours  levETIRAcetam  IVPB 500 milliGRAM(s) IV Intermittent every 12 hours  midazolam Infusion 0.02 mG/kG/Hr (2.2 mL/Hr) IV Continuous <Continuous>  norepinephrine Infusion 0.05 MICROgram(s)/kG/Min (5.16 mL/Hr) IV Continuous <Continuous>  piperacillin/tazobactam IVPB.. 3.375 Gram(s) IV Intermittent every 6 hours  polyethylene glycol 3350 17 Gram(s) Oral daily  propofol Infusion 10 MICROgram(s)/kG/Min (6.6 mL/Hr) IV Continuous <Continuous>  senna 2 Tablet(s) Oral at bedtime    MEDICATIONS  (PRN):  acetaminophen     Tablet .. 650 milliGRAM(s) Oral every 6 hours PRN Temp greater or equal to 38C (100.4F), Moderate Pain (4 - 6)  LORazepam   Injectable 1 milliGRAM(s) IV Push every 12 hours PRN Agitation  sodium chloride 0.9% lock flush 10 milliLiter(s) IV Push every 1 hour PRN Pre/post blood products, medications, blood draw, and to maintain line patency      New X-rays reviewed:                                                                                   CXR interpreted by me:  ET, OG ok

## 2023-01-26 NOTE — PROGRESS NOTE ADULT - ASSESSMENT
67 year old F with no past medical history (does not see doctors) was found by   unresponsive - called EMS who found the pt pulseless and coded pt ~45min s/p 10 shocks then ROSC.  at bedside reports pt was feeling  fine, not sick, no chest pains or SOB.        prolonged Cardiac arrest / aspiration pneumonia - with possible sepsis POA  / multiple rib fractures from CPR /  anoxic brain injury / CHB s/p TVP       - I spoke with pts  this am, who wants CMO and liberation from ventilator   - meds as per palliative medicine   - new ABIODUN filled out

## 2023-01-27 LAB
CULTURE RESULTS: SIGNIFICANT CHANGE UP
FUNGITELL: <31 PG/ML — SIGNIFICANT CHANGE UP
SPECIMEN SOURCE: SIGNIFICANT CHANGE UP

## 2023-01-28 NOTE — CHART NOTE - NSCHARTNOTEFT_GEN_A_CORE
spoke to trini at [place] office  death certificate was incomplete additional information provided cremation approved

## 2023-01-30 LAB — GALACTOMANNAN AG SERPL-ACNC: 0.05 INDEX — SIGNIFICANT CHANGE UP (ref 0–0.49)

## 2023-02-02 DIAGNOSIS — Z86.74 PERSONAL HISTORY OF SUDDEN CARDIAC ARREST: ICD-10-CM

## 2023-02-02 DIAGNOSIS — E66.9 OBESITY, UNSPECIFIED: ICD-10-CM

## 2023-02-02 DIAGNOSIS — G93.6 CEREBRAL EDEMA: ICD-10-CM

## 2023-02-02 DIAGNOSIS — M96.A3 MULTIPLE FRACTURES OF RIBS ASSOCIATED WITH CHEST COMPRESSION AND CARDIOPULMONARY RESUSCITATION: ICD-10-CM

## 2023-02-02 DIAGNOSIS — I44.2 ATRIOVENTRICULAR BLOCK, COMPLETE: ICD-10-CM

## 2023-02-02 DIAGNOSIS — G93.1 ANOXIC BRAIN DAMAGE, NOT ELSEWHERE CLASSIFIED: ICD-10-CM

## 2023-02-02 DIAGNOSIS — R00.1 BRADYCARDIA, UNSPECIFIED: ICD-10-CM

## 2023-02-02 DIAGNOSIS — I21.9 ACUTE MYOCARDIAL INFARCTION, UNSPECIFIED: ICD-10-CM

## 2023-02-02 DIAGNOSIS — B34.1 ENTEROVIRUS INFECTION, UNSPECIFIED: ICD-10-CM

## 2023-02-02 DIAGNOSIS — Z78.1 PHYSICAL RESTRAINT STATUS: ICD-10-CM

## 2023-02-02 DIAGNOSIS — R74.01 ELEVATION OF LEVELS OF LIVER TRANSAMINASE LEVELS: ICD-10-CM

## 2023-02-02 DIAGNOSIS — I46.8 CARDIAC ARREST DUE TO OTHER UNDERLYING CONDITION: ICD-10-CM

## 2023-02-02 DIAGNOSIS — J80 ACUTE RESPIRATORY DISTRESS SYNDROME: ICD-10-CM

## 2023-02-02 DIAGNOSIS — Z66 DO NOT RESUSCITATE: ICD-10-CM

## 2023-02-02 DIAGNOSIS — J69.0 PNEUMONITIS DUE TO INHALATION OF FOOD AND VOMIT: ICD-10-CM

## 2023-02-02 DIAGNOSIS — I49.01 VENTRICULAR FIBRILLATION: ICD-10-CM

## 2023-02-02 DIAGNOSIS — A41.9 SEPSIS, UNSPECIFIED ORGANISM: ICD-10-CM

## 2023-02-02 DIAGNOSIS — I47.20 VENTRICULAR TACHYCARDIA, UNSPECIFIED: ICD-10-CM

## 2023-02-02 DIAGNOSIS — I46.9 CARDIAC ARREST, CAUSE UNSPECIFIED: ICD-10-CM

## 2023-02-02 DIAGNOSIS — J18.9 PNEUMONIA, UNSPECIFIED ORGANISM: ICD-10-CM

## 2023-02-02 DIAGNOSIS — Z51.5 ENCOUNTER FOR PALLIATIVE CARE: ICD-10-CM
